# Patient Record
Sex: FEMALE | Race: WHITE | NOT HISPANIC OR LATINO
[De-identification: names, ages, dates, MRNs, and addresses within clinical notes are randomized per-mention and may not be internally consistent; named-entity substitution may affect disease eponyms.]

---

## 2019-05-09 ENCOUNTER — APPOINTMENT (OUTPATIENT)
Dept: FAMILY MEDICINE | Facility: CLINIC | Age: 54
End: 2019-05-09

## 2019-05-14 ENCOUNTER — APPOINTMENT (OUTPATIENT)
Dept: ENDOCRINOLOGY | Facility: CLINIC | Age: 54
End: 2019-05-14
Payer: COMMERCIAL

## 2019-05-14 VITALS
DIASTOLIC BLOOD PRESSURE: 60 MMHG | WEIGHT: 185 LBS | HEART RATE: 75 BPM | BODY MASS INDEX: 34.93 KG/M2 | RESPIRATION RATE: 16 BRPM | OXYGEN SATURATION: 96 % | HEIGHT: 61 IN | SYSTOLIC BLOOD PRESSURE: 124 MMHG

## 2019-05-14 LAB — GLUCOSE BLDC GLUCOMTR-MCNC: 118

## 2019-05-14 PROCEDURE — 99245 OFF/OP CONSLTJ NEW/EST HI 55: CPT | Mod: 25

## 2019-05-14 PROCEDURE — 82962 GLUCOSE BLOOD TEST: CPT

## 2019-05-14 PROCEDURE — 36415 COLL VENOUS BLD VENIPUNCTURE: CPT

## 2019-05-14 NOTE — CONSULT LETTER
[Dear  ___] : Dear  [unfilled], [Courtesy Letter:] : I had the pleasure of seeing your patient, [unfilled], in my office today. [Sincerely,] : Sincerely, [FreeTextEntry1] : Thank you for referring  Ms. BERNARDO BENSON to me for evaluation and treatment. Please, see attached consultation note. As always, if there are specific questions you would like to discuss, please feel free to contact me.\par Thank you for the courtesy of this evaluation.\par  [FreeTextEntry3] : Holli Ocasio MD, FACE, EC[facility]\par

## 2019-05-14 NOTE — ASSESSMENT
[Carbohydrate Consistent Diet] : carbohydrate consistent diet [Diabetes Foot Care] : diabetes foot care [Long Term Vascular Complications] : long term vascular complications of diabetes [Hypoglycemia Management] : hypoglycemia management [Self Monitoring of Blood Glucose] : self monitoring of blood glucose [Importance of Diet and Exercise] : importance of diet and exercise to improve glycemic control, achieve weight loss and improve cardiovascular health [FreeTextEntry1] : Current approaches to diabetes management are discussed with the patient. \par Target ranges for blood sugar, blood pressure and cholesterol reviewed, and risk reduction strategies verified. \par Hypoglycemia precautions reviewed with the patient. \par Suggested extensive diabetes education program, including nutritional and diabetes teaching and evaluation. \par Proper dietary restrictions and exercise routines discussed. \par Glucometer/SMBG and log book charting discussed.\par - check lipids, a1c, CMP, thyroid functions, antibodies status\par - reviewed with the patient options for switching to different classes of medications that are weight neutral and do not typically cause hypoglycemia. Will decide after the  blood work and possibly switch back to metformin\par - monitor urine microalbumin\par - in regards of her MNG, bilateral subcm nodules appear stable for the past 8+ years. We will be repeating another sonogram in 1-2 years\par She'll return in 2-3 days for results and dose adjustment.

## 2019-05-14 NOTE — HISTORY OF PRESENT ILLNESS
[FreeTextEntry1] : HISTORY OF PRESENT ILLNESS. \par \par Ms. BENSON was diagnosed with Diabetes Mellitus Type 2 in ?? \par Denies known complications of retinopathy, nephropathy, or neuropathy. \par Reports history obesity, HTN, dyslipidemia, MNG. Denies CAD. She underwent a lap-band in 2014; initially lost about 40 lbs, but regained 15 lbs since then\par Presently on glipizide XL 5mg only. She is also on losartan 50mg qd, crestor 5mg\par Blood sugars are checked 2 times a day. \par Did not bring log book, but reported are typically as following: FBS- 80's, PPG- 130's- 140's. \par Hypoglycemia frequency: reports one episode of  subjective hypoglycemia when going to the gym\par Fingerstick glucose in the office today is 118 mg/dL 5 hours after eating. \par Diet: tries to monitor CHO, increased fiber in the diet. \par Exercise: none\par \par Last dilated eye - Dr. Schaefer (03/18)\par Last podiatry visit  - several years ago\par Last cardiology evaluation - Dr. Mendenhall \par Last stress test - 2015\par Last 2-D Echo - 2015\par Last nephrology evaluation - none\par Last neurology evaluation- none\par \par Thyroid US (7/13/18)- enlarged.  multiple b/l subcm nodules, including RMP hypo 0.4x0.5x0.3 (stable); LMP 0.4x0.2x0.3 hypo (stable)\par Thyroid US (7/18/17)- enlarge. multiple b/l subcm nodules, including RMP hypo 0.5x0.3x0.4 (stable); LUP hypo 0.5x0.3x0.5 (stable)\par Thyroid US (3/21/16)- right superior 0.8 hypo nodule, LMP 0.4 hypo nodule- both are stable\par Thyroid US (4/15/15) -  right superior 0.5 hypo nodule, no left sided nodules\par Thyroid US (1/29/10)- right superior 0.4 hypo nodule, RMP 0.3 +0.4 hypo; LUP hypo 0.3, LUMP hypo 0.3- both described as stable c/w study from  2009\par \par Lab review: from 08/18- a1c- 6.5, LDL- 155\par \par

## 2019-05-15 LAB
ESTIMATED AVERAGE GLUCOSE: 143 MG/DL
FOLATE RBC-MCNC: 868 NG/ML
HBA1C MFR BLD HPLC: 6.6 %
HCT VFR BLD CALC: 40.3 %

## 2019-05-17 LAB
25(OH)D3 SERPL-MCNC: 21.5 NG/ML
ALBUMIN SERPL ELPH-MCNC: 4.5 G/DL
ALP BLD-CCNC: 94 U/L
ALT SERPL-CCNC: 13 U/L
ANION GAP SERPL CALC-SCNC: 13 MMOL/L
AST SERPL-CCNC: 12 U/L
BILIRUB SERPL-MCNC: 0.3 MG/DL
BUN SERPL-MCNC: 11 MG/DL
CALCIUM SERPL-MCNC: 9.1 MG/DL
CHLORIDE SERPL-SCNC: 106 MMOL/L
CHOLEST SERPL-MCNC: 233 MG/DL
CHOLEST/HDLC SERPL: 4.3 RATIO
CO2 SERPL-SCNC: 24 MMOL/L
CREAT SERPL-MCNC: 0.63 MG/DL
CREAT SPEC-SCNC: 116 MG/DL
FRUCTOSAMINE SERPL-MCNC: 231 UMOL/L
GLUCOSE SERPL-MCNC: 97 MG/DL
HDLC SERPL-MCNC: 54 MG/DL
LDLC SERPL CALC-MCNC: 148 MG/DL
MICROALBUMIN 24H UR DL<=1MG/L-MCNC: <1.2 MG/DL
MICROALBUMIN/CREAT 24H UR-RTO: NORMAL MG/G
POTASSIUM SERPL-SCNC: 4.1 MMOL/L
PROT SERPL-MCNC: 7.1 G/DL
SODIUM SERPL-SCNC: 143 MMOL/L
T4 FREE SERPL-MCNC: 1.2 NG/DL
TRIGL SERPL-MCNC: 154 MG/DL
TSH SERPL-ACNC: 1.18 UIU/ML
VIT B12 SERPL-MCNC: 331 PG/ML

## 2019-08-13 RX ORDER — SITAGLIPTIN AND METFORMIN HYDROCHLORIDE 50; 500 MG/1; MG/1
50-500 TABLET, FILM COATED ORAL TWICE DAILY
Qty: 60 | Refills: 6 | Status: DISCONTINUED | COMMUNITY
Start: 2019-05-17 | End: 2019-08-13

## 2020-02-04 ENCOUNTER — RX RENEWAL (OUTPATIENT)
Age: 55
End: 2020-02-04

## 2020-04-07 ENCOUNTER — APPOINTMENT (OUTPATIENT)
Dept: ENDOCRINOLOGY | Facility: CLINIC | Age: 55
End: 2020-04-07

## 2020-04-20 ENCOUNTER — RX RENEWAL (OUTPATIENT)
Age: 55
End: 2020-04-20

## 2020-06-06 ENCOUNTER — RX RENEWAL (OUTPATIENT)
Age: 55
End: 2020-06-06

## 2020-06-08 ENCOUNTER — RX RENEWAL (OUTPATIENT)
Age: 55
End: 2020-06-08

## 2020-06-25 ENCOUNTER — APPOINTMENT (OUTPATIENT)
Dept: ENDOCRINOLOGY | Facility: CLINIC | Age: 55
End: 2020-06-25

## 2020-07-08 ENCOUNTER — APPOINTMENT (OUTPATIENT)
Dept: ENDOCRINOLOGY | Facility: CLINIC | Age: 55
End: 2020-07-08
Payer: COMMERCIAL

## 2020-07-08 VITALS
HEART RATE: 77 BPM | OXYGEN SATURATION: 98 % | SYSTOLIC BLOOD PRESSURE: 142 MMHG | RESPIRATION RATE: 16 BRPM | WEIGHT: 182 LBS | DIASTOLIC BLOOD PRESSURE: 80 MMHG | BODY MASS INDEX: 34.36 KG/M2 | HEIGHT: 61 IN

## 2020-07-08 LAB — GLUCOSE BLDC GLUCOMTR-MCNC: 146

## 2020-07-08 PROCEDURE — 99214 OFFICE O/P EST MOD 30 MIN: CPT

## 2020-07-08 PROCEDURE — 82962 GLUCOSE BLOOD TEST: CPT

## 2020-07-08 NOTE — ASSESSMENT
[Carbohydrate Consistent Diet] : carbohydrate consistent diet [Diabetes Foot Care] : diabetes foot care [Hypoglycemia Management] : hypoglycemia management [Long Term Vascular Complications] : long term vascular complications of diabetes [Importance of Diet and Exercise] : importance of diet and exercise to improve glycemic control, achieve weight loss and improve cardiovascular health [Self Monitoring of Blood Glucose] : self monitoring of blood glucose [FreeTextEntry1] : - compliance is reiterated\par - continue Jentadueto 2.5/500 mg bid\par - increase crestor 5 mg, continue CoQ10 300 mg qd\par - vitamin D3 5000 IU/day\par - in regards of her MNG, bilateral subcm nodules appear stable for the past 8+ years. We will be repeating another sonogram next year (7/21)\par RTC 3 mos

## 2020-07-08 NOTE — HISTORY OF PRESENT ILLNESS
[FreeTextEntry1] : F/u for diabetes management and MNG.\par \par *** Jul 08, 2020 ***\par \par missed f/u appts\par feels well. denies any c/o\par on jentadueto 2.5/ 500 mg bid, crestor 5 mg qw at most, CoQ10 300 mg qd\par \par a1c- 6.4, LDL- 135, TC- 231\par \par HISTORY OF PRESENT ILLNESS. \par \par Ms. BENSON was diagnosed with Diabetes Mellitus Type 2 in ?? \par Denies known complications of retinopathy, nephropathy, or neuropathy. \par Reports history obesity, HTN, dyslipidemia, MNG. Denies CAD. She underwent a lap-band in 2014; initially lost about 40 lbs, but regained 15 lbs since then\par Presently on glipizide XL 5mg only. She is also on losartan 50mg qd, crestor 5mg\par Blood sugars are checked 2 times a day. \par Did not bring log book, but reported are typically as following: FBS- 80's, PPG- 130's- 140's. \par Hypoglycemia frequency: reports one episode of  subjective hypoglycemia when going to the gym\par Fingerstick glucose in the office today is 118 mg/dL 5 hours after eating. \par Diet: tries to monitor CHO, increased fiber in the diet. \par Exercise: none\par \par Thyroid US (7/13/18)- enlarged.  multiple b/l subcm nodules, including RMP hypo 0.4x0.5x0.3 (stable); LMP 0.4x0.2x0.3 hypo (stable)\par Thyroid US (7/18/17)- enlarge. multiple b/l subcm nodules, including RMP hypo 0.5x0.3x0.4 (stable); LUP hypo 0.5x0.3x0.5 (stable)\par Thyroid US (3/21/16)- right superior 0.8 hypo nodule, LMP 0.4 hypo nodule- both are stable\par Thyroid US (4/15/15) -  right superior 0.5 hypo nodule, no left sided nodules\par Thyroid US (1/29/10)- right superior 0.4 hypo nodule, RMP 0.3 +0.4 hypo; LUP hypo 0.3, LUMP hypo 0.3- both described as stable c/w study from  2009\par \par Lab review: from 08/18- a1c- 6.5, LDL- 155\par \par \par ****\par \par Last dilated eye - Dr. Schaefer (04/20)\par Last podiatry visit  - several years ago\par Last cardiology evaluation - Dr. Mendenhall \par Last stress test - 2015\par Last 2-D Echo - 2015\par Last nephrology evaluation - none\par Last neurology evaluation- none\par

## 2020-10-08 ENCOUNTER — APPOINTMENT (OUTPATIENT)
Dept: ENDOCRINOLOGY | Facility: CLINIC | Age: 55
End: 2020-10-08

## 2021-05-04 ENCOUNTER — APPOINTMENT (OUTPATIENT)
Dept: ENDOCRINOLOGY | Facility: CLINIC | Age: 56
End: 2021-05-04

## 2021-07-19 ENCOUNTER — RX RENEWAL (OUTPATIENT)
Age: 56
End: 2021-07-19

## 2021-09-01 ENCOUNTER — RX RENEWAL (OUTPATIENT)
Age: 56
End: 2021-09-01

## 2021-09-05 ENCOUNTER — RX RENEWAL (OUTPATIENT)
Age: 56
End: 2021-09-05

## 2021-11-08 ENCOUNTER — APPOINTMENT (OUTPATIENT)
Dept: ENDOCRINOLOGY | Facility: CLINIC | Age: 56
End: 2021-11-08
Payer: COMMERCIAL

## 2021-11-08 VITALS
OXYGEN SATURATION: 98 % | HEART RATE: 87 BPM | HEIGHT: 61 IN | TEMPERATURE: 98 F | BODY MASS INDEX: 35.68 KG/M2 | WEIGHT: 189 LBS | SYSTOLIC BLOOD PRESSURE: 120 MMHG | DIASTOLIC BLOOD PRESSURE: 60 MMHG | RESPIRATION RATE: 17 BRPM

## 2021-11-08 LAB — GLUCOSE BLDC GLUCOMTR-MCNC: 126

## 2021-11-08 PROCEDURE — 95250 CONT GLUC MNTR PHYS/QHP EQP: CPT

## 2021-11-08 PROCEDURE — 99214 OFFICE O/P EST MOD 30 MIN: CPT | Mod: 25

## 2021-11-08 PROCEDURE — 95251 CONT GLUC MNTR ANALYSIS I&R: CPT

## 2021-11-08 PROCEDURE — 36415 COLL VENOUS BLD VENIPUNCTURE: CPT

## 2021-11-08 RX ORDER — LINAGLIPTIN AND METFORMIN HYDROCHLORIDE 2.5; 5 MG/1; MG/1
2.5-5 TABLET, FILM COATED ORAL
Qty: 60 | Refills: 0 | Status: DISCONTINUED | COMMUNITY
Start: 2019-08-13 | End: 2021-11-08

## 2021-11-08 NOTE — ASSESSMENT
[Carbohydrate Consistent Diet] : carbohydrate consistent diet [Hypoglycemia Management] : hypoglycemia management [Diabetes Foot Care] : diabetes foot care [Long Term Vascular Complications] : long term vascular complications of diabetes [Importance of Diet and Exercise] : importance of diet and exercise to improve glycemic control, achieve weight loss and improve cardiovascular health [Self Monitoring of Blood Glucose] : self monitoring of blood glucose [FreeTextEntry1] : 1. T2DM, uncontrolled\par - compliance is reiterated\par - Gloria PRO\par - d/c Jentadueto \par - Metformin 1000 mg bid\par - Ozempic 0.25 mg qw and uptitrate as directed\par - Jardiance 10 mg qd\par - crestor 5 mg, continue CoQ10 300 mg qd\par - vitamin D3 5000 IU/day\par \par 2. MNG\par -bilateral subcm nodules appear stable for the past 8+ years. \par  repeat another sonogram this year \par RTC 2- 3 weeks for sensor download and CDE evaluation\par

## 2021-11-08 NOTE — HISTORY OF PRESENT ILLNESS
[FreeTextEntry1] : F/u for diabetes management and MNG.\par \par *** Nov 08, 2021 ***\par \par again, missed f/u visits\par on jentadueto 2.5/ 500 mg bid, crestor 5 mg qw at most, CoQ10 300 mg qd\par labs from 10/31/21-  a1c- 7.7\par reports fbs- 160's, ppg- not checking\par \par *** Jul 08, 2020 ***\par \par missed f/u appts\par feels well. denies any c/o\par on jentadueto 2.5/ 500 mg bid, crestor 5 mg qw at most, CoQ10 300 mg qd\par \par a1c- 6.4, LDL- 135, TC- 231\par \par HISTORY OF PRESENT ILLNESS. \par \par Ms. BENSON was diagnosed with Diabetes Mellitus Type 2 in 2003\par Denies known complications of retinopathy, nephropathy, or neuropathy. \par Reports history obesity, HTN, dyslipidemia, MNG. Denies CAD. She underwent a lap-band in 2014; initially lost about 40 lbs, but regained 15 lbs since then\par Presently on glipizide XL 5mg only. She is also on losartan 50mg qd, crestor 5mg\par Blood sugars are checked 2 times a day. \par Did not bring log book, but reported are typically as following: FBS- 80's, PPG- 130's- 140's. \par Hypoglycemia frequency: reports one episode of  subjective hypoglycemia when going to the gym\par Fingerstick glucose in the office today is 118 mg/dL 5 hours after eating. \par Diet: tries to monitor CHO, increased fiber in the diet. \par Exercise: none\par \par Thyroid US (7/13/18)- enlarged.  multiple b/l subcm nodules, including RMP hypo 0.4x0.5x0.3 (stable); LMP 0.4x0.2x0.3 hypo (stable)\par Thyroid US (7/18/17)- enlarge. multiple b/l subcm nodules, including RMP hypo 0.5x0.3x0.4 (stable); LUP hypo 0.5x0.3x0.5 (stable)\par Thyroid US (3/21/16)- right superior 0.8 hypo nodule, LMP 0.4 hypo nodule- both are stable\par Thyroid US (4/15/15) -  right superior 0.5 hypo nodule, no left sided nodules\par Thyroid US (1/29/10)- right superior 0.4 hypo nodule, RMP 0.3 +0.4 hypo; LUP hypo 0.3, LUMP hypo 0.3- both described as stable c/w study from  2009\par \par Lab review: from 08/18- a1c- 6.5, LDL- 155\par \par \par ****\par \par Last dilated eye -   (04/21)\par Last podiatry visit  - 5/21 ([facility])\par Last cardiology evaluation - Dr. Mendenhall \par Last stress test - 2015\par Last 2-D Echo - 2015\par Last nephrology evaluation - none\par Last neurology evaluation- none\par

## 2021-11-09 LAB
CREAT SPEC-SCNC: 162 MG/DL
MICROALBUMIN 24H UR DL<=1MG/L-MCNC: <1.2 MG/DL
MICROALBUMIN/CREAT 24H UR-RTO: NORMAL MG/G

## 2021-11-30 ENCOUNTER — APPOINTMENT (OUTPATIENT)
Dept: ENDOCRINOLOGY | Facility: CLINIC | Age: 56
End: 2021-11-30
Payer: COMMERCIAL

## 2021-11-30 VITALS — WEIGHT: 184 LBS | HEIGHT: 61 IN | BODY MASS INDEX: 34.74 KG/M2

## 2021-11-30 PROCEDURE — G0108 DIAB MANAGE TRN  PER INDIV: CPT

## 2022-04-14 ENCOUNTER — APPOINTMENT (OUTPATIENT)
Dept: ENDOCRINOLOGY | Facility: CLINIC | Age: 57
End: 2022-04-14
Payer: COMMERCIAL

## 2022-04-14 VITALS
BODY MASS INDEX: 32.47 KG/M2 | SYSTOLIC BLOOD PRESSURE: 129 MMHG | TEMPERATURE: 98 F | OXYGEN SATURATION: 97 % | HEIGHT: 61 IN | DIASTOLIC BLOOD PRESSURE: 50 MMHG | RESPIRATION RATE: 17 BRPM | WEIGHT: 172 LBS | HEART RATE: 88 BPM

## 2022-04-14 LAB — GLUCOSE BLDC GLUCOMTR-MCNC: 151

## 2022-04-14 PROCEDURE — 82962 GLUCOSE BLOOD TEST: CPT

## 2022-04-14 PROCEDURE — 99214 OFFICE O/P EST MOD 30 MIN: CPT | Mod: 25

## 2022-04-14 NOTE — ASSESSMENT
[Carbohydrate Consistent Diet] : carbohydrate consistent diet [Hypoglycemia Management] : hypoglycemia management [Diabetes Foot Care] : diabetes foot care [Long Term Vascular Complications] : long term vascular complications of diabetes [Importance of Diet and Exercise] : importance of diet and exercise to improve glycemic control, achieve weight loss and improve cardiovascular health [Self Monitoring of Blood Glucose] : self monitoring of blood glucose [FreeTextEntry1] : 1. T2DM, well controlled\par - compliance is reiterated\par - Metformin 1000 mg bid\par - Ozempic 0.5 mg qw. Patient does not want to increase  the dose at present\par - Jardiance 10 mg qd\par - crestor 5 mg, continue CoQ10 300 mg qd\par - vitamin D3 5000 IU/day\par \par 2. MNG\par -bilateral subcm nodules appear stable for the past 8+ years. \par  repeat another sonogram  in 3 years \par RTC 4-6 mos\par

## 2022-04-14 NOTE — HISTORY OF PRESENT ILLNESS
[FreeTextEntry1] : F/u for diabetes management and MNG.\par \par *** Apr 14, 2022 ***\par \par feels great, lost 17 lbs on ozempic\par Metformin 1000 mg bid,  Ozempic 0.5 mg qw,  Jardiance 10 mg qd\par not checking her FS consistently\par labs from 3/26/22- a1c- 6.0, LDL- 68\par \par Thyroid ultrasound (11/27/2021)–bilateral subcentimeter thyroid nodules, including right midpole solid 0.5 x 0.3 x 0.4 cm nodule, left upper pole 0.4 x 0.3 x 0.5 solid nodule, and left midpole 0.4 x 0.3 x 0.3 solid nodule.\par \par *** Nov 08, 2021 ***\par \par again, missed f/u visits\par on jentadueto 2.5/ 500 mg bid, crestor 5 mg qw at most, CoQ10 300 mg qd\par labs from 10/31/21-  a1c- 7.7\par reports fbs- 160's, ppg- not checking\par \par *** Jul 08, 2020 ***\par \par missed f/u appts\par feels well. denies any c/o\par on jentadueto 2.5/ 500 mg bid, crestor 5 mg qw at most, CoQ10 300 mg qd\par \par a1c- 6.4, LDL- 135, TC- 231\par \par HISTORY OF PRESENT ILLNESS. \par \par Ms. BENSON was diagnosed with Diabetes Mellitus Type 2 in 2003\par Denies known complications of retinopathy, nephropathy, or neuropathy. \par Reports history obesity, HTN, dyslipidemia, MNG. Denies CAD. She underwent a lap-band in 2014; initially lost about 40 lbs, but regained 15 lbs since then\par Presently on glipizide XL 5mg only. She is also on losartan 50mg qd, crestor 5mg\par Blood sugars are checked 2 times a day. \par Did not bring log book, but reported are typically as following: FBS- 80's, PPG- 130's- 140's. \par Hypoglycemia frequency: reports one episode of  subjective hypoglycemia when going to the gym\par Fingerstick glucose in the office today is 118 mg/dL 5 hours after eating. \par Diet: tries to monitor CHO, increased fiber in the diet. \par Exercise: none\par \par Thyroid US (7/13/18)- enlarged.  multiple b/l subcm nodules, including RMP hypo 0.4x0.5x0.3 (stable); LMP 0.4x0.2x0.3 hypo (stable)\par Thyroid US (7/18/17)- enlarge. multiple b/l subcm nodules, including RMP hypo 0.5x0.3x0.4 (stable); LUP hypo 0.5x0.3x0.5 (stable)\par Thyroid US (3/21/16)- right superior 0.8 hypo nodule, LMP 0.4 hypo nodule- both are stable\par Thyroid US (4/15/15) -  right superior 0.5 hypo nodule, no left sided nodules\par Thyroid US (1/29/10)- right superior 0.4 hypo nodule, RMP 0.3 +0.4 hypo; LUP hypo 0.3, LUMP hypo 0.3- both described as stable c/w study from  2009\par \par Lab review: from 08/18- a1c- 6.5, LDL- 155\par \par \par ****\par \par Last dilated eye -   (04/21)\par Last podiatry visit  - 5/21 ([facility])\par Last cardiology evaluation - Dr. Mendenhall \par Last stress test - 2015\par Last 2-D Echo - 2015\par Last nephrology evaluation - none\par Last neurology evaluation- none\par

## 2022-05-09 ENCOUNTER — OUTPATIENT (OUTPATIENT)
Dept: OUTPATIENT SERVICES | Facility: HOSPITAL | Age: 57
LOS: 1 days | End: 2022-05-09
Payer: COMMERCIAL

## 2022-05-09 ENCOUNTER — APPOINTMENT (OUTPATIENT)
Dept: MAMMOGRAPHY | Facility: CLINIC | Age: 57
End: 2022-05-09
Payer: COMMERCIAL

## 2022-05-09 ENCOUNTER — APPOINTMENT (OUTPATIENT)
Dept: ULTRASOUND IMAGING | Facility: CLINIC | Age: 57
End: 2022-05-09
Payer: COMMERCIAL

## 2022-05-09 DIAGNOSIS — Z12.31 ENCOUNTER FOR SCREENING MAMMOGRAM FOR MALIGNANT NEOPLASM OF BREAST: ICD-10-CM

## 2022-05-09 PROCEDURE — 77067 SCR MAMMO BI INCL CAD: CPT | Mod: 26

## 2022-05-09 PROCEDURE — 76641 ULTRASOUND BREAST COMPLETE: CPT | Mod: 26,50

## 2022-05-09 PROCEDURE — 76641 ULTRASOUND BREAST COMPLETE: CPT

## 2022-05-09 PROCEDURE — 77063 BREAST TOMOSYNTHESIS BI: CPT

## 2022-05-09 PROCEDURE — 77067 SCR MAMMO BI INCL CAD: CPT

## 2022-05-09 PROCEDURE — 77063 BREAST TOMOSYNTHESIS BI: CPT | Mod: 26

## 2022-06-01 ENCOUNTER — RX RENEWAL (OUTPATIENT)
Age: 57
End: 2022-06-01

## 2022-08-02 ENCOUNTER — APPOINTMENT (OUTPATIENT)
Dept: FAMILY MEDICINE | Facility: CLINIC | Age: 57
End: 2022-08-02

## 2022-08-02 ENCOUNTER — NON-APPOINTMENT (OUTPATIENT)
Age: 57
End: 2022-08-02

## 2022-08-02 VITALS
TEMPERATURE: 97.3 F | SYSTOLIC BLOOD PRESSURE: 136 MMHG | OXYGEN SATURATION: 97 % | HEIGHT: 61 IN | HEART RATE: 80 BPM | BODY MASS INDEX: 32.28 KG/M2 | WEIGHT: 171 LBS | DIASTOLIC BLOOD PRESSURE: 84 MMHG

## 2022-08-02 DIAGNOSIS — Z80.0 FAMILY HISTORY OF MALIGNANT NEOPLASM OF DIGESTIVE ORGANS: ICD-10-CM

## 2022-08-02 DIAGNOSIS — Z82.49 FAMILY HISTORY OF ISCHEMIC HEART DISEASE AND OTHER DISEASES OF THE CIRCULATORY SYSTEM: ICD-10-CM

## 2022-08-02 DIAGNOSIS — Z00.00 ENCOUNTER FOR GENERAL ADULT MEDICAL EXAMINATION W/OUT ABNORMAL FINDINGS: ICD-10-CM

## 2022-08-02 DIAGNOSIS — Z80.3 FAMILY HISTORY OF MALIGNANT NEOPLASM OF BREAST: ICD-10-CM

## 2022-08-02 PROCEDURE — 99386 PREV VISIT NEW AGE 40-64: CPT | Mod: 25

## 2022-08-02 PROCEDURE — 93000 ELECTROCARDIOGRAM COMPLETE: CPT

## 2022-08-02 PROCEDURE — 36415 COLL VENOUS BLD VENIPUNCTURE: CPT

## 2022-08-02 NOTE — HEALTH RISK ASSESSMENT
[Former] : Former [Yes] : Yes [2 - 4 times a month (2 pts)] : 2-4 times a month (2 points) [With Significant Other] : lives with significant other [# of Members in Household ___] :  household currently consist of [unfilled] member(s) [Employed] : employed [] :  [# Of Children ___] : has [unfilled] children [de-identified] : 30 years-pack a day smoker  [YearQuit] : 1999 [de-identified] : 3-4x a week [de-identified] : Varied diet [FreeTextEntry2] :  in Middle School  [FreeTextEntry3] : 2 sons

## 2022-08-02 NOTE — PLAN
[FreeTextEntry1] : Discussed bariatric re-eval-if problems with lap band.  \par \par Labs from endocrine reviewed. \par Patient returning for Pre-Op evaluation.\par Asked to have records from Cardiology sent to office, prior EKG. \par EKG today-sinus rhythm-negative precordial t-waves.\par \par HTN-meds refilled; has been out for several weeks. \par \par HM-up to date Gyn, will be rescheduling colonoscopy. \par \par \par

## 2022-08-02 NOTE — PHYSICAL EXAM
[Normal Sclera/Conjunctiva] : normal sclera/conjunctiva [Normal Oropharynx] : the oropharynx was normal [Normal TMs] : both tympanic membranes were normal [No Lymphadenopathy] : no lymphadenopathy [No Edema] : there was no peripheral edema [No Extremity Clubbing/Cyanosis] : no extremity clubbing/cyanosis [Normal] : affect was normal and insight and judgment were intact [de-identified] : lap band

## 2022-08-02 NOTE — REVIEW OF SYSTEMS
[Negative] : Genitourinary [Headache] : no headache [Dizziness] : no dizziness [de-identified] : 6-7 hours of sleep per night

## 2022-08-02 NOTE — HISTORY OF PRESENT ILLNESS
[FreeTextEntry1] : Here to establish care/annual physical.  [de-identified] : Here to establish care/annual physical. \par Pre-Surgical Testing . \par Genetic predeposition to cancer-planning to take out ovaries prophylactically. \par \par Mammogram-May 6th\par Pap Smear-May 2022-normal \par Colonoscopy-2017-due for follow-up.  \par \par Cardiologist-Dr. Cardona-saw within the past year. \par \par Sx: , lap band, pilonidal cyst\par \par Family Hx: Colon cancer, pancreatic cancer, bile duct cancer, breast cancer in cousin, brother lymphoma \par \par -Lap band-8 years ago.    Had adjusted after surgery. \par

## 2022-08-17 ENCOUNTER — APPOINTMENT (OUTPATIENT)
Dept: FAMILY MEDICINE | Facility: CLINIC | Age: 57
End: 2022-08-17

## 2022-08-17 VITALS
WEIGHT: 176 LBS | TEMPERATURE: 97.6 F | HEIGHT: 61 IN | HEART RATE: 77 BPM | SYSTOLIC BLOOD PRESSURE: 128 MMHG | BODY MASS INDEX: 33.23 KG/M2 | OXYGEN SATURATION: 97 % | DIASTOLIC BLOOD PRESSURE: 70 MMHG

## 2022-08-17 DIAGNOSIS — Z01.818 ENCOUNTER FOR OTHER PREPROCEDURAL EXAMINATION: ICD-10-CM

## 2022-08-17 PROCEDURE — 99214 OFFICE O/P EST MOD 30 MIN: CPT

## 2022-08-17 NOTE — PLAN
[FreeTextEntry1] : Patient to hold Jardiance 4 days before surgery. \par Holding metformin morning of procedure. \par Holding Ozempic this week.\par Patient was counseled to stop any Advil/Aleve/aspirin and any blood-thinning medications/supplements 7 days prior to surgery and was advised to have nothing by mouth from 11 pm the night prior to surgery.\par \par Patient expressed understanding of plan.

## 2022-08-17 NOTE — RESULTS/DATA
[] : results reviewed [de-identified] : sinus bradycardia at 57 bpm [de-identified] : Type and Screen Preliminary

## 2022-08-17 NOTE — ASSESSMENT
[High Risk Surgery - Intraperitoneal, Intrathoracic or Supringuinal Vascular Procedures] : High Risk Surgery - Intraperitoneal, Intrathoracic or Supringuinal Vascular Procedures - No (0) [Ischemic Heart Disease] : Ischemic Heart Disease - No (0) [Congestive Heart Failure] : Congestive Heart Failure - No (0) [Prior Cerebrovascular Accident or TIA] : Prior Cerebrovascular Accident or TIA - No (0) [Creatinine >= 2mg/dL (1 Point)] : Creatinine >= 2mg/dL - No (0) [Insulin-dependent Diabetic (1 Point)] : Insulin-dependent Diabetic - No (0) [0] : 0 , RCRI Class: I, Risk of Post-Op Cardiac Complications: 3.9%, 95% CI for Risk Estimate: 2.8% - 5.4% [Patient Optimized for Surgery] : Patient optimized for surgery [As per surgery] : as per surgery [FreeTextEntry4] : Patient to hold Jardiance 4 days before surgery. \par Holding metformin morning of procedure. \par Holding Ozempic this week.\par Patient was counseled to stop any Advil/Aleve/aspirin and any blood-thinning medications/supplements 7 days prior to surgery and was advised to have nothing by mouth from 11 pm the night prior to surgery.\par

## 2022-08-17 NOTE — PHYSICAL EXAM
[Normal Oropharynx] : the oropharynx was normal [No Lymphadenopathy] : no lymphadenopathy [No Edema] : there was no peripheral edema [No Extremity Clubbing/Cyanosis] : no extremity clubbing/cyanosis [Normal] : affect was normal and insight and judgment were intact [Soft] : abdomen soft [Non Tender] : non-tender [Normal Bowel Sounds] : normal bowel sounds

## 2022-08-17 NOTE — HISTORY OF PRESENT ILLNESS
[No Pertinent Cardiac History] : no history of aortic stenosis, atrial fibrillation, coronary artery disease, recent myocardial infarction, or implantable device/pacemaker [No Adverse Anesthesia Reaction] : no adverse anesthesia reaction in self or family member [Diabetes] : diabetes [(Patient denies any chest pain, claudication, dyspnea on exertion, orthopnea, palpitations or syncope)] : Patient denies any chest pain, claudication, dyspnea on exertion, orthopnea, palpitations or syncope [Aortic Stenosis] : no aortic stenosis [Atrial Fibrillation] : no atrial fibrillation [Coronary Artery Disease] : no coronary artery disease [Recent Myocardial Infarction] : no recent myocardial infarction [Implantable Device/Pacemaker] : no implantable device/pacemaker [Asthma] : no asthma [COPD] : no COPD [Sleep Apnea] : no sleep apnea [Smoker] : not a smoker [Chronic Anticoagulation] : no chronic anticoagulation [Chronic Kidney Disease] : no chronic kidney disease [FreeTextEntry1] : Robotic BSO [FreeTextEntry2] : 08/22/2022 [FreeTextEntry3] : Dr. Schneider [FreeTextEntry4] : Here for pre-operative evaluation for Robotic BSO; planning for ambulatory procedure. \par Patient has to do home COVID test the night before procedure.  \par \par Received her initial COVID series, has not gotten booster yet. \par \par Feeling well today. \par Medications and allergies reviewed.\par  [FreeTextEntry7] : January Echo 2021

## 2022-08-25 ENCOUNTER — APPOINTMENT (OUTPATIENT)
Dept: ENDOCRINOLOGY | Facility: CLINIC | Age: 57
End: 2022-08-25

## 2022-10-11 ENCOUNTER — APPOINTMENT (OUTPATIENT)
Dept: FAMILY MEDICINE | Facility: CLINIC | Age: 57
End: 2022-10-11

## 2022-10-11 ENCOUNTER — LABORATORY RESULT (OUTPATIENT)
Age: 57
End: 2022-10-11

## 2022-10-11 VITALS
OXYGEN SATURATION: 97 % | SYSTOLIC BLOOD PRESSURE: 118 MMHG | BODY MASS INDEX: 32.66 KG/M2 | HEART RATE: 78 BPM | TEMPERATURE: 97.4 F | HEIGHT: 61 IN | DIASTOLIC BLOOD PRESSURE: 70 MMHG | WEIGHT: 173 LBS

## 2022-10-11 DIAGNOSIS — Z23 ENCOUNTER FOR IMMUNIZATION: ICD-10-CM

## 2022-10-11 PROCEDURE — 90686 IIV4 VACC NO PRSV 0.5 ML IM: CPT

## 2022-10-11 PROCEDURE — 99214 OFFICE O/P EST MOD 30 MIN: CPT | Mod: 25

## 2022-10-11 PROCEDURE — 36415 COLL VENOUS BLD VENIPUNCTURE: CPT

## 2022-10-11 PROCEDURE — G0008: CPT

## 2022-10-11 NOTE — PLAN
[FreeTextEntry1] : Will follow up labwork drawn in office today.\par \par DM-check labs-seeing endocrine early next year. \par \par Received flu shot.\par Advised Ms. BERNARDO BENSON they may experience some soreness, tenderness at the injection site.  Advised to call office if  not improving.  Ms. BENSON expressed understanding.\par \par Will adjust meds based on labs. \par Patient expressed understanding of plan.\par

## 2022-10-11 NOTE — HISTORY OF PRESENT ILLNESS
[FreeTextEntry1] : Here for follow-up; needs TB screening for work  [de-identified] : Here for follow-up; needs TB screening for work \par Feeling well today. \par \par No history of positive tuberculosis test. \par

## 2022-10-13 LAB
ALBUMIN SERPL ELPH-MCNC: 4.4 G/DL
ALP BLD-CCNC: 104 U/L
ALT SERPL-CCNC: 19 U/L
ANION GAP SERPL CALC-SCNC: 11 MMOL/L
APPEARANCE: CLEAR
AST SERPL-CCNC: 12 U/L
BASOPHILS # BLD AUTO: 0.04 K/UL
BASOPHILS NFR BLD AUTO: 0.5 %
BILIRUB SERPL-MCNC: 0.5 MG/DL
BILIRUBIN URINE: NEGATIVE
BLOOD URINE: NEGATIVE
BUN SERPL-MCNC: 10 MG/DL
CALCIUM SERPL-MCNC: 9.2 MG/DL
CHLORIDE SERPL-SCNC: 105 MMOL/L
CHOLEST SERPL-MCNC: 250 MG/DL
CO2 SERPL-SCNC: 27 MMOL/L
COLOR: NORMAL
CREAT SERPL-MCNC: 0.61 MG/DL
EGFR: 104 ML/MIN/1.73M2
EOSINOPHIL # BLD AUTO: 0.09 K/UL
EOSINOPHIL NFR BLD AUTO: 1.2 %
ESTIMATED AVERAGE GLUCOSE: 137 MG/DL
GLUCOSE QUALITATIVE U: ABNORMAL
GLUCOSE SERPL-MCNC: 81 MG/DL
HBA1C MFR BLD HPLC: 6.4 %
HCT VFR BLD CALC: 40.1 %
HDLC SERPL-MCNC: 53 MG/DL
HGB BLD-MCNC: 13 G/DL
IMM GRANULOCYTES NFR BLD AUTO: 0.3 %
KETONES URINE: NEGATIVE
LDLC SERPL CALC-MCNC: 156 MG/DL
LEUKOCYTE ESTERASE URINE: NEGATIVE
LYMPHOCYTES # BLD AUTO: 3 K/UL
LYMPHOCYTES NFR BLD AUTO: 38.6 %
MAN DIFF?: NORMAL
MCHC RBC-ENTMCNC: 29 PG
MCHC RBC-ENTMCNC: 32.4 GM/DL
MCV RBC AUTO: 89.5 FL
MONOCYTES # BLD AUTO: 0.48 K/UL
MONOCYTES NFR BLD AUTO: 6.2 %
NEUTROPHILS # BLD AUTO: 4.15 K/UL
NEUTROPHILS NFR BLD AUTO: 53.2 %
NITRITE URINE: NEGATIVE
NONHDLC SERPL-MCNC: 197 MG/DL
PH URINE: 6
PLATELET # BLD AUTO: 386 K/UL
POTASSIUM SERPL-SCNC: 4.6 MMOL/L
PROT SERPL-MCNC: 7 G/DL
PROTEIN URINE: NEGATIVE
RBC # BLD: 4.48 M/UL
RBC # FLD: 13.6 %
SODIUM SERPL-SCNC: 142 MMOL/L
SPECIFIC GRAVITY URINE: 1.01
TRIGL SERPL-MCNC: 204 MG/DL
TSH SERPL-ACNC: 0.77 UIU/ML
UROBILINOGEN URINE: NORMAL
WBC # FLD AUTO: 7.78 K/UL

## 2022-10-14 LAB
M TB IFN-G BLD-IMP: NEGATIVE
QUANTIFERON TB PLUS MITOGEN MINUS NIL: >10 IU/ML
QUANTIFERON TB PLUS NIL: 0.02 IU/ML
QUANTIFERON TB PLUS TB1 MINUS NIL: 0 IU/ML
QUANTIFERON TB PLUS TB2 MINUS NIL: 0 IU/ML

## 2022-11-20 ENCOUNTER — RX RENEWAL (OUTPATIENT)
Age: 57
End: 2022-11-20

## 2022-12-20 ENCOUNTER — RX RENEWAL (OUTPATIENT)
Age: 57
End: 2022-12-20

## 2023-01-03 ENCOUNTER — RX RENEWAL (OUTPATIENT)
Age: 58
End: 2023-01-03

## 2023-02-15 ENCOUNTER — RX RENEWAL (OUTPATIENT)
Age: 58
End: 2023-02-15

## 2023-02-16 ENCOUNTER — RX RENEWAL (OUTPATIENT)
Age: 58
End: 2023-02-16

## 2023-02-20 ENCOUNTER — NON-APPOINTMENT (OUTPATIENT)
Age: 58
End: 2023-02-20

## 2023-02-22 LAB
ALBUMIN SERPL ELPH-MCNC: 4.4 G/DL
ALP BLD-CCNC: 81 U/L
ALT SERPL-CCNC: 18 U/L
ANION GAP SERPL CALC-SCNC: 13 MMOL/L
AST SERPL-CCNC: 14 U/L
BILIRUB SERPL-MCNC: 0.9 MG/DL
BUN SERPL-MCNC: 8 MG/DL
CALCIUM SERPL-MCNC: 9.7 MG/DL
CHLORIDE SERPL-SCNC: 105 MMOL/L
CHOLEST SERPL-MCNC: 207 MG/DL
CO2 SERPL-SCNC: 25 MMOL/L
CREAT SERPL-MCNC: 0.68 MG/DL
CREAT SPEC-SCNC: 193 MG/DL
EGFR: 102 ML/MIN/1.73M2
ESTIMATED AVERAGE GLUCOSE: 140 MG/DL
FOLATE SERPL-MCNC: >20 NG/ML
FRUCTOSAMINE SERPL-MCNC: 226 UMOL/L
GLUCOSE SERPL-MCNC: 100 MG/DL
HBA1C MFR BLD HPLC: 6.5 %
HDLC SERPL-MCNC: 53 MG/DL
LDLC SERPL CALC-MCNC: 123 MG/DL
MICROALBUMIN 24H UR DL<=1MG/L-MCNC: <1.2 MG/DL
MICROALBUMIN/CREAT 24H UR-RTO: NORMAL MG/G
NONHDLC SERPL-MCNC: 154 MG/DL
POTASSIUM SERPL-SCNC: 4.6 MMOL/L
PROT SERPL-MCNC: 6.6 G/DL
SODIUM SERPL-SCNC: 143 MMOL/L
T4 FREE SERPL-MCNC: 1.4 NG/DL
TRIGL SERPL-MCNC: 153 MG/DL
TSH SERPL-ACNC: 0.8 UIU/ML
VIT B12 SERPL-MCNC: 246 PG/ML

## 2023-02-27 ENCOUNTER — TRANSCRIPTION ENCOUNTER (OUTPATIENT)
Age: 58
End: 2023-02-27

## 2023-02-27 ENCOUNTER — APPOINTMENT (OUTPATIENT)
Dept: ENDOCRINOLOGY | Facility: CLINIC | Age: 58
End: 2023-02-27
Payer: COMMERCIAL

## 2023-02-27 VITALS
BODY MASS INDEX: 32.66 KG/M2 | TEMPERATURE: 98 F | WEIGHT: 173 LBS | HEART RATE: 70 BPM | DIASTOLIC BLOOD PRESSURE: 70 MMHG | SYSTOLIC BLOOD PRESSURE: 120 MMHG | RESPIRATION RATE: 16 BRPM | OXYGEN SATURATION: 97 % | HEIGHT: 61 IN

## 2023-02-27 DIAGNOSIS — E04.2 NONTOXIC MULTINODULAR GOITER: ICD-10-CM

## 2023-02-27 LAB — GLUCOSE BLDC GLUCOMTR-MCNC: 79

## 2023-02-27 PROCEDURE — 82962 GLUCOSE BLOOD TEST: CPT

## 2023-02-27 PROCEDURE — 99214 OFFICE O/P EST MOD 30 MIN: CPT | Mod: 25

## 2023-02-27 RX ORDER — ROSUVASTATIN CALCIUM 5 MG/1
5 TABLET, FILM COATED ORAL DAILY
Qty: 30 | Refills: 3 | Status: DISCONTINUED | COMMUNITY
Start: 2021-09-01 | End: 2023-02-27

## 2023-02-27 NOTE — ASSESSMENT
[Diabetes Foot Care] : diabetes foot care [Long Term Vascular Complications] : long term vascular complications of diabetes [Carbohydrate Consistent Diet] : carbohydrate consistent diet [Importance of Diet and Exercise] : importance of diet and exercise to improve glycemic control, achieve weight loss and improve cardiovascular health [Exercise/Effect on Glucose] : exercise/effect on glucose [Hypoglycemia Management] : hypoglycemia management [Glucagon Use] : glucagon use [Self Monitoring of Blood Glucose] : self monitoring of blood glucose [Injection Technique, Storage, Sharps Disposal] : injection technique, storage, and sharps disposal [Retinopathy Screening] : Patient was referred to ophthalmology for retinopathy screening [Diabetic Medications] : Risks and benefits of diabetic medications were discussed [FreeTextEntry1] : 1. T2DM, well controlled\par - compliance is reiterated\par - Metformin 1000 mg bid\par - increase Ozempic 1 mg qw. Patient does not want to increase  the dose at present\par - Jardiance 10 mg qd\par - d/c crestor. trial of Pravastatin 10 mg HS, continue CoQ10 300 mg qd\par - vitamin D3 5000 IU/day\par \par 2. MNG\par -bilateral subcm nodules appear stable for the past 8+ years. \par  repeat another sonogram  in 3 years \par RTC 4-6 mos\par

## 2023-02-27 NOTE — HISTORY OF PRESENT ILLNESS
[FreeTextEntry1] : F/u for diabetes management and MNG.\par \par *** Feb 27, 2023 ***\par \par feels well. weight is stable\par Metformin 1000 mg bid,  Ozempic 0.5 mg qw,  Jardiance 10 mg qd, Crestor 5 mg tiw only b/o myalgia\par reports fbs- 90- 120\par a1c- 6.5, LDL- 126\par \par *** Apr 14, 2022 ***\par \par feels great, lost 17 lbs on ozempic\par Metformin 1000 mg bid,  Ozempic 0.5 mg qw,  Jardiance 10 mg qd\par not checking her FS consistently\par labs from 3/26/22- a1c- 6.0, LDL- 68\par \par Thyroid ultrasound (11/27/2021)–bilateral subcentimeter thyroid nodules, including right midpole solid 0.5 x 0.3 x 0.4 cm nodule, left upper pole 0.4 x 0.3 x 0.5 solid nodule, and left midpole 0.4 x 0.3 x 0.3 solid nodule.\par \par *** Nov 08, 2021 ***\par \par again, missed f/u visits\par on jentadueto 2.5/ 500 mg bid, crestor 5 mg qw at most, CoQ10 300 mg qd\par labs from 10/31/21-  a1c- 7.7\par reports fbs- 160's, ppg- not checking\par \par *** Jul 08, 2020 ***\par \par missed f/u appts\par feels well. denies any c/o\par on jentadueto 2.5/ 500 mg bid, crestor 5 mg qw at most, CoQ10 300 mg qd\par \par a1c- 6.4, LDL- 135, TC- 231\par \par HISTORY OF PRESENT ILLNESS. \par \par Ms. BENSON was diagnosed with Diabetes Mellitus Type 2 in 2003\par Denies known complications of retinopathy, nephropathy, or neuropathy. \par Reports history obesity, HTN, dyslipidemia, MNG. Denies CAD. She underwent a lap-band in 2014; initially lost about 40 lbs, but regained 15 lbs since then\par Presently on glipizide XL 5mg only. She is also on losartan 50mg qd, crestor 5mg\par Blood sugars are checked 2 times a day. \par Did not bring log book, but reported are typically as following: FBS- 80's, PPG- 130's- 140's. \par Hypoglycemia frequency: reports one episode of  subjective hypoglycemia when going to the gym\par Fingerstick glucose in the office today is 118 mg/dL 5 hours after eating. \par Diet: tries to monitor CHO, increased fiber in the diet. \par Exercise: none\par \par Thyroid US (7/13/18)- enlarged.  multiple b/l subcm nodules, including RMP hypo 0.4x0.5x0.3 (stable); LMP 0.4x0.2x0.3 hypo (stable)\par Thyroid US (7/18/17)- enlarge. multiple b/l subcm nodules, including RMP hypo 0.5x0.3x0.4 (stable); LUP hypo 0.5x0.3x0.5 (stable)\par Thyroid US (3/21/16)- right superior 0.8 hypo nodule, LMP 0.4 hypo nodule- both are stable\par Thyroid US (4/15/15) -  right superior 0.5 hypo nodule, no left sided nodules\par Thyroid US (1/29/10)- right superior 0.4 hypo nodule, RMP 0.3 +0.4 hypo; LUP hypo 0.3, LUMP hypo 0.3- both described as stable c/w study from  2009\par \par Lab review: from 08/18- a1c- 6.5, LDL- 155\par \par \par ****\par \par Last dilated eye -   (04/21)\par Last podiatry visit  - 5/21 ([facility])\par Last cardiology evaluation - Dr. Mendenhall \par Last stress test - 2015\par Last 2-D Echo - 2015\par Last nephrology evaluation - none\par Last neurology evaluation- none\par

## 2023-03-03 ENCOUNTER — RX RENEWAL (OUTPATIENT)
Age: 58
End: 2023-03-03

## 2023-03-14 ENCOUNTER — RX RENEWAL (OUTPATIENT)
Age: 58
End: 2023-03-14

## 2023-03-15 ENCOUNTER — APPOINTMENT (OUTPATIENT)
Dept: INTERNAL MEDICINE | Facility: CLINIC | Age: 58
End: 2023-03-15
Payer: COMMERCIAL

## 2023-03-15 VITALS — SYSTOLIC BLOOD PRESSURE: 125 MMHG | DIASTOLIC BLOOD PRESSURE: 80 MMHG

## 2023-03-15 VITALS
TEMPERATURE: 98.6 F | RESPIRATION RATE: 16 BRPM | OXYGEN SATURATION: 96 % | HEIGHT: 61 IN | BODY MASS INDEX: 32.66 KG/M2 | HEART RATE: 92 BPM | WEIGHT: 173 LBS

## 2023-03-15 DIAGNOSIS — H66.90 OTITIS MEDIA, UNSPECIFIED, UNSPECIFIED EAR: ICD-10-CM

## 2023-03-15 DIAGNOSIS — J01.00 ACUTE MAXILLARY SINUSITIS, UNSPECIFIED: ICD-10-CM

## 2023-03-15 PROCEDURE — 99214 OFFICE O/P EST MOD 30 MIN: CPT

## 2023-03-15 NOTE — REVIEW OF SYSTEMS
[Earache] : earache [Sore Throat] : sore throat [Postnasal Drip] : postnasal drip [Cough] : cough [Negative] : Psychiatric [Hearing Loss] : no hearing loss [Nosebleed] : no nosebleeds [Hoarseness] : no hoarseness [Nasal Discharge] : no nasal discharge [FreeTextEntry4] : sinus/nasal congestion

## 2023-03-15 NOTE — HEALTH RISK ASSESSMENT
[No] : No [No falls in past year] : Patient reported no falls in the past year [0] : 2) Feeling down, depressed, or hopeless: Not at all (0) [PHQ-2 Negative - No further assessment needed] : PHQ-2 Negative - No further assessment needed [Former] : Former [[id number]] : 0

## 2023-03-15 NOTE — ASSESSMENT
[FreeTextEntry1] : # acute Maxillary Sinusitis\par #Otitis Media b/l\par - Augmentin bid for 7 days with food\par - take medication with food\par - Tylenol/ for fever body aches\par - rest drink plenty of fluids\par - no improvement 3-4 days return to office/tele visit/urgent care\par \par #HTN\par continue losartan 50mg qd\par DASH DIET \par Exercise.\par Lower your salt intake.\par Reduce your alcohol consumption.\par Learn relaxation methods.\par Eating is a very important part of controlling blood pressure. Recommend Total salt intake to 2.4g/day.\par Do not add salt while preparing or eating your meals.\par Try to avoid red meats, sweets and sugar containing beverages.\par Ensure you are eating 5 fruits and vegetables a day as well as whole grains.\par \par #HL\par continue pravastatin 10mg qhs\par Compliant w/ medication\par No side effects\par Follow low cholesterol diet\par Discussed healthy diet and lifestyle.\par Avoid tobacco. \par Get more exercise. Try to get about 150 minutes of physical activity every week, or about 30 minutes per day for most days of the week.\par Keep a healthy weight. Losing even 10% of your body weight makes a difference in your cholesterol levels.\par Reduce the effect of negative emotions, Learn healthy ways to deal with anger, stress or other negative emotions.\par Replace saturated fat and trans fat with healthier fats.\par Bad Fats - margarine, french fries, doughnuts, cookies, pastries, crackers, processed meats, canola oil and hydrogenated oils\par Good Fats/Unsaturated fats - avocados, eggs (with out the yellow), coconut oil, Raw Nuts, [place], Flaxseed, Leafy green veg, organic butter, organic nut \par Eat heart-healthy foods like fruits, vegetables, poultry, fish and whole grains. Limit red meat, sugary products and dairy products made with whole milk.\par \par #TypeIIDM\par Continue monitoring FSG and or CGM. Encouraged to count carbohydrates, exercise daily and examine feet daily. If FSG/CGM are consistently greater than 300 or less than 70, patient should call MD. The patient was instructed to eat regularly without skipping meals.  \par Continue ozempic 1mg qweekly, metformin 1000mg bid and jardiance 10mg \par \par pt agrees and understands plan via teach back method. all questions answered.\par \par \par

## 2023-03-15 NOTE — HISTORY OF PRESENT ILLNESS
[FreeTextEntry8] : 57 F hx of HTN, HL, TypeIIDM \par presents with a 3-4 days of URI symptoms including:\par Rhinitis, productive cough, sinus congestion, \par Denies throat irritation\par Denies fever, body aches \par mild earache/ear pressure\par Denies SOB or wheezing or CP or palpitations\par Denies NVD\par Denies HA\par + covid vaccinated\par denies any recent travel\par denies any recent sick contacts\par denies any loss of taste or smell.\par covid test negative this morning\par Pt is monitoring blood sugar - last a1c from 2/23 - 6.5%,\par , Triglycerides 153\par CR 0.68\par tolerating statin and losartan well along with ozempic 1mg qweekly, metformin 1000mg bid and jardiance 10mg \par Pt has no acute complaints.\par \par

## 2023-03-15 NOTE — PHYSICAL EXAM
[No Lymphadenopathy] : no lymphadenopathy [Normal] : affect was normal and insight and judgment were intact [de-identified] : no erythema, no tonsillar enlargement bilaterally, no exudate bilaterally, uvula midline. +PND, + erythema in b/l ear canal

## 2023-04-27 ENCOUNTER — RX RENEWAL (OUTPATIENT)
Age: 58
End: 2023-04-27

## 2023-06-12 ENCOUNTER — RX RENEWAL (OUTPATIENT)
Age: 58
End: 2023-06-12

## 2023-06-13 ENCOUNTER — RX RENEWAL (OUTPATIENT)
Age: 58
End: 2023-06-13

## 2023-07-03 ENCOUNTER — RX RENEWAL (OUTPATIENT)
Age: 58
End: 2023-07-03

## 2023-07-24 ENCOUNTER — NON-APPOINTMENT (OUTPATIENT)
Age: 58
End: 2023-07-24

## 2023-07-24 ENCOUNTER — APPOINTMENT (OUTPATIENT)
Dept: INTERNAL MEDICINE | Facility: CLINIC | Age: 58
End: 2023-07-24
Payer: COMMERCIAL

## 2023-07-24 VITALS
OXYGEN SATURATION: 97 % | HEIGHT: 61 IN | SYSTOLIC BLOOD PRESSURE: 116 MMHG | TEMPERATURE: 97.7 F | DIASTOLIC BLOOD PRESSURE: 77 MMHG | RESPIRATION RATE: 16 BRPM | HEART RATE: 73 BPM | BODY MASS INDEX: 32.66 KG/M2 | WEIGHT: 173 LBS

## 2023-07-24 DIAGNOSIS — Z11.1 ENCOUNTER FOR SCREENING FOR RESPIRATORY TUBERCULOSIS: ICD-10-CM

## 2023-07-24 DIAGNOSIS — Z87.891 PERSONAL HISTORY OF NICOTINE DEPENDENCE: ICD-10-CM

## 2023-07-24 DIAGNOSIS — Z12.31 ENCOUNTER FOR SCREENING MAMMOGRAM FOR MALIGNANT NEOPLASM OF BREAST: ICD-10-CM

## 2023-07-24 DIAGNOSIS — Z29.9 ENCOUNTER FOR PROPHYLACTIC MEASURES, UNSPECIFIED: ICD-10-CM

## 2023-07-24 DIAGNOSIS — Z13.31 ENCOUNTER FOR SCREENING FOR DEPRESSION: ICD-10-CM

## 2023-07-24 DIAGNOSIS — Z82.49 FAMILY HISTORY OF ISCHEMIC HEART DISEASE AND OTHER DISEASES OF THE CIRCULATORY SYSTEM: ICD-10-CM

## 2023-07-24 DIAGNOSIS — Z12.11 ENCOUNTER FOR SCREENING FOR MALIGNANT NEOPLASM OF COLON: ICD-10-CM

## 2023-07-24 DIAGNOSIS — Z12.2 ENCOUNTER FOR SCREENING FOR MALIGNANT NEOPLASM OF RESPIRATORY ORGANS: ICD-10-CM

## 2023-07-24 DIAGNOSIS — Z00.00 ENCOUNTER FOR GENERAL ADULT MEDICAL EXAMINATION W/OUT ABNORMAL FINDINGS: ICD-10-CM

## 2023-07-24 PROCEDURE — 93000 ELECTROCARDIOGRAM COMPLETE: CPT

## 2023-07-24 PROCEDURE — 99396 PREV VISIT EST AGE 40-64: CPT | Mod: 25

## 2023-07-24 PROCEDURE — 36415 COLL VENOUS BLD VENIPUNCTURE: CPT

## 2023-07-24 RX ORDER — AMOXICILLIN AND CLAVULANATE POTASSIUM 875; 125 MG/1; MG/1
875-125 TABLET, COATED ORAL
Qty: 14 | Refills: 0 | Status: COMPLETED | COMMUNITY
Start: 2023-03-15 | End: 2023-07-24

## 2023-07-24 NOTE — ASSESSMENT
[FreeTextEntry1] : #CPE\par f/u blood and urine\par ekg - NSR no acute significant st or t wave changes - interpreted and reviewed results with pt.\par immunizations - will get shingles at pharmacy, PPSV 23 received\par I spent 5 minutes with the patient conducting a screen using approved screening tool (PHQ2) and discussing results of said screen with patient during this encounter.\par The patient was counseled to get regular exercise and sleep, wear sunblock and wear a safety belt in the car.\par Check CBC, CMP Hgba1c , TSH and UA\par Pap Smear\par Mammo\par Colonoscopy \par Ophtho\par Derm\par Dental\par Lung Cancer Screening\par Hx of hearing loss f/u ENT and audiology \par \par #HTN\par Continue Medication\par DASH DIET \par Exercise.\par Lower your salt intake.\par Reduce your alcohol consumption.\par Learn relaxation methods.\par Eating is a very important part of controlling blood pressure. Recommend Total salt intake to 2.4g/day.\par Do not add salt while preparing or eating your meals.\par Try to avoid red meats, sweets and sugar containing beverages.\par Ensure you are eating 5 fruits and vegetables a day as well as whole grains.\par \par #TypeIIDM\par statin and losartan well along with ozempic 1mg qweekly, metformin 1000mg bid and jardiance 10mg \par Continue monitoring FSG and or CGM. Encouraged to count carbohydrates, exercise daily and examine feet daily. If FSG/CGM are consistently greater than 300 or less than 70, patient should call MD. The patient was instructed to eat regularly without skipping meals.  \par Continue\par Check hgba1c\par Check umicroalb\par See ophtho\par Fasting blood glucose < 130\par Postprandial blood glucose < 160\par \par #Hearing loss \par - f/u ENT\par \par #HL\par Continue current management\par Compliant w/ medication\par No side effects\par Follow low cholesterol diet\par Discussed healthy diet and lifestyle.\par Avoid tobacco. \par Get more exercise. Try to get about 150 minutes of physical activity every week, or about 30 minutes per day for most days of the week.\par Keep a healthy weight. Losing even 10% of your body weight makes a difference in your cholesterol levels.\par Reduce the effect of negative emotions, Learn healthy ways to deal with anger, stress or other negative emotions.\par Replace saturated fat and trans fat with healthier fats.\par Bad Fats - margarine, french fries, doughnuts, cookies, pastries, crackers, processed meats, canola oil and hydrogenated oils\par Good Fats/Unsaturated fats - avocados, eggs (with out the yellow), coconut oil, Raw Nuts, [place], Flaxseed, Leafy green veg, organic butter, organic nut \par Eat heart-healthy foods like fruits, vegetables, poultry, fish and whole grains. Limit red meat, sugary products and dairy products made with whole milk.\par \par #TB screening\par f/u [facility]\par denies a cough lasting longer than three weeks, unexplained weight loss, chills, night sweats or a fever, and loss of appetite.\par \par #Thyroid Cysts\par following Endo Dr. Ocasio\par \par pt agrees and understands plan via teach back method. all questions answered.\par

## 2023-07-24 NOTE — HISTORY OF PRESENT ILLNESS
[de-identified] : 58 year F here for Complete Physical Exam.\par Pt is daily exercising?  No\par Vaccinations:\par covid - fully vaccinated\par flu - will get this fall \par tdap - got in past 10 years\par shingles - will get at pharmacy \par PCV -PPSV 23\par \par Screening:\par last colonoscopy: due for Dr. Daquan Horn [place]  last 2016 - hx of polyps due for one\par denies any unintentional weight loss, blood in stool, anemia or dysphagia of solids or liquids\par paternal grandmother - colon cancer\par \par obgyn: Dr Adela Joaquin | [facility]\par LMP: postmenopausal \par last pap: due for pap - has appt with obgyn\par last mammo: due for pap - has appt with obgyn\par Pregnancy:  - two boys \par 1 cousin had breast cancer - maternal \par \par -Past Medical History: \par Benign essential hypertension \par Cervical disc herniation \par Depression \par Diabetes mellitus \par Gout\par Hyperlipidemia\par Lumbar disc disease \par Multinodular goiter \par Thyroid cyst \par Uncontrolled type 2 diabetes mellitus with hyperglycemia \par \par -Allergies: NKDA\par \par -Medications:\par Aspirin 81 MG TABS; TAKE 1 TABLET DAILY\par Flonase 50 MCG/ACT SUSP\par FreeStyle Lite Test In Vitro Strip; USE TO TEST BLOOD GLUCOSE LEVELS ONCE DAILY\par Jardiance 10 MG Oral Tablet; TAKE ONE (1) TABLET BY MOUTH ONCE DAILY\par Losartan Potassium 50 MG Oral Tablet; TAKE ONE (1) TABLET BY MOUTH ONCE DAILY\par metFORMIN HCl - 1000 MG Oral Tablet; TAKE 1 TABLET BY MOUTH TWICE DAILY\par Montelukast Sodium 10 MG Oral Tablet; TAKE 1 TABLET IN THE EVENING\par Ozempic (1 MG/DOSE) 4 MG/3ML Subcutaneous Solution Pen-injector; 1 mg SQ weekly\par as directed\par Pravastatin Sodium 10 MG Oral Tablet; TAKE 1 TABLET BY MOUTH EVERYDAY AT\par BEDTIME\par Turmeric Curcumin Oral Capsule\par Vitamin D (Ergocalciferol) CAPS; TAKE 1 CAPSULE Daily\par -Surgical Hx:\par - C- section\par - Pilondial cyst \par \par -Family Hx:\par Mother -arthritis -  2/2 to respiratory issues\par Father - heart disease\par Maternal Grandfather - heart disease - pna?\par Maternal Uncle - bile duct/pancreatic cancer\par Maternal Grandmother - typeIIDM -  \par Paternal Grandfather: heart disease  \par Paternal Grandmother: unknown \par \par Siblings:\par 1 brother - lymphoma -  due to blood clots\par 1 sister healthy \par Children: 2 sons healthy \par : healthy   \par pt denies any family hx of cervical, endometrial, uterine, ovarian,  lung, prostate  or testicular cancer\par \par -Social\par ETOH - socially \par Smoker - former quit  at age approx 1 pack a day > 20 years\par Illicit Drug use - denies\par \par -Occupation: \par \par Pt has no acute complaints\par \par \par

## 2023-07-24 NOTE — PHYSICAL EXAM
[No Acute Distress] : no acute distress [Normal Sclera/Conjunctiva] : normal sclera/conjunctiva [PERRL] : pupils equal round and reactive to light [EOMI] : extraocular movements intact [No Lymphadenopathy] : no lymphadenopathy [Normal Oropharynx] : the oropharynx was normal [No Respiratory Distress] : no respiratory distress  [No Accessory Muscle Use] : no accessory muscle use [Clear to Auscultation] : lungs were clear to auscultation bilaterally [No Edema] : there was no peripheral edema [Declined Breast Exam] : declined breast exam  [Declined Rectal Exam] : declined rectal exam [Normal Posterior Cervical Nodes] : no posterior cervical lymphadenopathy [Normal Anterior Cervical Nodes] : no anterior cervical lymphadenopathy [Normal] : affect was normal and insight and judgment were intact [No CVA Tenderness] : no CVA  tenderness [No Spinal Tenderness] : no spinal tenderness [Normal Outer Ear/Nose] : the outer ears and nose were normal in appearance [de-identified] : diminished hearing  [de-identified] : RRR [de-identified] : declined [de-identified] : mood stable denies si/hi

## 2023-07-24 NOTE — REVIEW OF SYSTEMS
[Negative] : Heme/Lymph [Hearing Loss] : hearing loss [Earache] : no earache [Nosebleed] : no nosebleeds [Hoarseness] : no hoarseness [Nasal Discharge] : no nasal discharge [Sore Throat] : no sore throat [Postnasal Drip] : no postnasal drip [FreeTextEntry4] : hearing loss

## 2023-07-24 NOTE — HEALTH RISK ASSESSMENT
[No] : No [No falls in past year] : Patient reported no falls in the past year [0] : 2) Feeling down, depressed, or hopeless: Not at all (0) [PHQ-2 Negative - No further assessment needed] : PHQ-2 Negative - No further assessment needed [HIV test declined] : HIV test declined [Hepatitis C test declined] : Hepatitis C test declined [None] : None [Employed] : employed [With Family] : lives with family [Feels Safe at Home] : Feels safe at home [Fully functional (bathing, dressing, toileting, transferring, walking, feeding)] : Fully functional (bathing, dressing, toileting, transferring, walking, feeding) [Fully functional (using the telephone, shopping, preparing meals, housekeeping, doing laundry, using] : Fully functional and needs no help or supervision to perform IADLs (using the telephone, shopping, preparing meals, housekeeping, doing laundry, using transportation, managing medications and managing finances) [Reports normal functional visual acuity (ie: able to read med bottle)] : Reports normal functional visual acuity [Smoke Detector] : smoke detector [Carbon Monoxide Detector] : carbon monoxide detector [Safety elements used in home] : safety elements used in home [Seat Belt] :  uses seat belt [Sunscreen] : uses sunscreen [High School] : high school [] :  [# Of Children ___] : has [unfilled] children [Reports changes in hearing] : Reports changes in hearing [Former] : Former [[id number]] : 0 [Change in mental status noted] : No change in mental status noted [Language] : denies difficulty with language [Behavior] : denies difficulty with behavior [Learning/Retaining New Information] : denies difficulty learning/retaining new information [Handling Complex Tasks] : denies difficulty handling complex tasks [Reasoning] : denies difficulty with reasoning [Spatial Ability and Orientation] : denies difficulty with spatial ability and orientation [Sexually Active] : not sexually active [High Risk Behavior] : no high risk behavior [Reports changes in vision] : Reports no changes in vision [Reports changes in dental health] : Reports no changes in dental health [Guns at Home] : no guns at home [Travel to Developing Areas] : does not  travel to developing areas [TB Exposure] : is not being exposed to tuberculosis [Caregiver Concerns] : does not have caregiver concerns [de-identified] : quit 2005

## 2023-07-27 LAB
25(OH)D3 SERPL-MCNC: 25.3 NG/ML
ALBUMIN SERPL ELPH-MCNC: 4.6 G/DL
ALP BLD-CCNC: 88 U/L
ALT SERPL-CCNC: 16 U/L
ANION GAP SERPL CALC-SCNC: 13 MMOL/L
APPEARANCE: CLEAR
AST SERPL-CCNC: 13 U/L
BACTERIA: NEGATIVE /HPF
BILIRUB SERPL-MCNC: 1 MG/DL
BILIRUBIN URINE: NEGATIVE
BLOOD URINE: NEGATIVE
BUN SERPL-MCNC: 11 MG/DL
CALCIUM SERPL-MCNC: 9.5 MG/DL
CAST: 0 /LPF
CHLORIDE SERPL-SCNC: 104 MMOL/L
CHOLEST SERPL-MCNC: 251 MG/DL
CO2 SERPL-SCNC: 24 MMOL/L
COLOR: ABNORMAL
CREAT SERPL-MCNC: 0.67 MG/DL
CREAT SPEC-SCNC: 41 MG/DL
EGFR: 101 ML/MIN/1.73M2
EPITHELIAL CELLS: 1 /HPF
ESTIMATED AVERAGE GLUCOSE: 120 MG/DL
FOLATE SERPL-MCNC: 15.4 NG/ML
GLUCOSE QUALITATIVE U: >=1000 MG/DL
GLUCOSE SERPL-MCNC: 88 MG/DL
HBA1C MFR BLD HPLC: 5.8 %
HBV SURFACE AB SERPL IA-ACNC: <3 MIU/ML
HDLC SERPL-MCNC: 54 MG/DL
KETONES URINE: NEGATIVE MG/DL
LDLC SERPL CALC-MCNC: 158 MG/DL
LEUKOCYTE ESTERASE URINE: NEGATIVE
M TB IFN-G BLD-IMP: NEGATIVE
MICROALBUMIN 24H UR DL<=1MG/L-MCNC: <1.2 MG/DL
MICROALBUMIN/CREAT 24H UR-RTO: NORMAL MG/G
MICROSCOPIC-UA: NORMAL
NITRITE URINE: NEGATIVE
NONHDLC SERPL-MCNC: 197 MG/DL
PH URINE: 6
POTASSIUM SERPL-SCNC: 4.8 MMOL/L
PROT SERPL-MCNC: 7.1 G/DL
PROTEIN URINE: NEGATIVE MG/DL
QUANTIFERON TB PLUS MITOGEN MINUS NIL: 8.67 IU/ML
QUANTIFERON TB PLUS NIL: 0.01 IU/ML
QUANTIFERON TB PLUS TB1 MINUS NIL: 0 IU/ML
QUANTIFERON TB PLUS TB2 MINUS NIL: 0 IU/ML
RED BLOOD CELLS URINE: 0 /HPF
SODIUM SERPL-SCNC: 141 MMOL/L
SPECIFIC GRAVITY URINE: 1.01
TRIGL SERPL-MCNC: 211 MG/DL
TSH SERPL-ACNC: 0.91 UIU/ML
UROBILINOGEN URINE: 0.2 MG/DL
VIT B12 SERPL-MCNC: 575 PG/ML
WHITE BLOOD CELLS URINE: 0 /HPF

## 2023-07-28 ENCOUNTER — NON-APPOINTMENT (OUTPATIENT)
Age: 58
End: 2023-07-28

## 2023-08-04 ENCOUNTER — APPOINTMENT (OUTPATIENT)
Dept: CT IMAGING | Facility: CLINIC | Age: 58
End: 2023-08-04

## 2023-08-17 ENCOUNTER — RX RENEWAL (OUTPATIENT)
Age: 58
End: 2023-08-17

## 2023-08-22 ENCOUNTER — RX RENEWAL (OUTPATIENT)
Age: 58
End: 2023-08-22

## 2023-08-25 ENCOUNTER — APPOINTMENT (OUTPATIENT)
Dept: CARDIOLOGY | Facility: CLINIC | Age: 58
End: 2023-08-25

## 2023-11-03 ENCOUNTER — RX RENEWAL (OUTPATIENT)
Age: 58
End: 2023-11-03

## 2023-12-06 ENCOUNTER — APPOINTMENT (OUTPATIENT)
Dept: ENDOCRINOLOGY | Facility: CLINIC | Age: 58
End: 2023-12-06

## 2023-12-09 ENCOUNTER — APPOINTMENT (OUTPATIENT)
Dept: MAMMOGRAPHY | Facility: CLINIC | Age: 58
End: 2023-12-09

## 2023-12-09 ENCOUNTER — APPOINTMENT (OUTPATIENT)
Dept: ULTRASOUND IMAGING | Facility: CLINIC | Age: 58
End: 2023-12-09

## 2023-12-20 ENCOUNTER — RX RENEWAL (OUTPATIENT)
Age: 58
End: 2023-12-20

## 2023-12-21 RX ORDER — MONTELUKAST 10 MG/1
10 TABLET, FILM COATED ORAL
Qty: 30 | Refills: 0 | Status: ACTIVE | COMMUNITY
Start: 2022-11-20 | End: 1900-01-01

## 2024-01-04 ENCOUNTER — RX RENEWAL (OUTPATIENT)
Age: 59
End: 2024-01-04

## 2024-01-30 ENCOUNTER — APPOINTMENT (OUTPATIENT)
Dept: ENDOCRINOLOGY | Facility: CLINIC | Age: 59
End: 2024-01-30
Payer: COMMERCIAL

## 2024-01-30 VITALS
BODY MASS INDEX: 33.42 KG/M2 | SYSTOLIC BLOOD PRESSURE: 120 MMHG | OXYGEN SATURATION: 97 % | HEIGHT: 61 IN | RESPIRATION RATE: 16 BRPM | TEMPERATURE: 97.7 F | WEIGHT: 177 LBS | HEART RATE: 82 BPM | DIASTOLIC BLOOD PRESSURE: 72 MMHG

## 2024-01-30 LAB — GLUCOSE BLDC GLUCOMTR-MCNC: 121

## 2024-01-30 PROCEDURE — 99214 OFFICE O/P EST MOD 30 MIN: CPT | Mod: 25

## 2024-01-30 PROCEDURE — 82962 GLUCOSE BLOOD TEST: CPT

## 2024-01-30 RX ORDER — SEMAGLUTIDE 1.34 MG/ML
4 INJECTION, SOLUTION SUBCUTANEOUS
Qty: 3 | Refills: 3 | Status: ACTIVE | COMMUNITY
Start: 2021-11-08 | End: 1900-01-01

## 2024-01-30 RX ORDER — METFORMIN HYDROCHLORIDE 1000 MG/1
1000 TABLET, COATED ORAL
Qty: 180 | Refills: 3 | Status: ACTIVE | COMMUNITY
Start: 2021-11-08 | End: 1900-01-01

## 2024-01-30 RX ORDER — PRAVASTATIN SODIUM 20 MG/1
20 TABLET ORAL
Qty: 90 | Refills: 3 | Status: ACTIVE | COMMUNITY
Start: 2023-02-27 | End: 1900-01-01

## 2024-01-30 NOTE — HISTORY OF PRESENT ILLNESS
[FreeTextEntry1] : F/u for diabetes management and MNG.  *** Jan 30, 2024 ***  missed f/u visit feels well, no new c/o Metformin 1000 mg bid,  Ozempic 1 mg qw,  Jardiance 10 mg qd, Pravastatin 10 mg HS (tolerates well) reports fbs- upto 110, ppg-  not checking last a1c- 5.8%  *** Feb 27, 2023 ***  feels well. weight is stable Metformin 1000 mg bid,  Ozempic 0.5 mg qw,  Jardiance 10 mg qd, Crestor 5 mg tiw only b/o myalgia reports fbs- 90- 120 a1c- 6.5, LDL- 126  *** Apr 14, 2022 ***  feels great, lost 17 lbs on ozempic Metformin 1000 mg bid,  Ozempic 0.5 mg qw,  Jardiance 10 mg qd not checking her FS consistently labs from 3/26/22- a1c- 6.0, LDL- 68  Thyroid ultrasound (11/27/2021)-bilateral subcentimeter thyroid nodules, including right midpole solid 0.5 x 0.3 x 0.4 cm nodule, left upper pole 0.4 x 0.3 x 0.5 solid nodule, and left midpole 0.4 x 0.3 x 0.3 solid nodule.  *** Nov 08, 2021 ***  again, missed f/u visits on jentadueto 2.5/ 500 mg bid, crestor 5 mg qw at most, CoQ10 300 mg qd labs from 10/31/21-  a1c- 7.7 reports fbs- 160's, ppg- not checking  *** Jul 08, 2020 ***  missed f/u appts feels well. denies any c/o on jentadueto 2.5/ 500 mg bid, crestor 5 mg qw at most, CoQ10 300 mg qd  a1c- 6.4, LDL- 135, TC- 231  HISTORY OF PRESENT ILLNESS.   Ms. BENSON was diagnosed with Diabetes Mellitus Type 2 in 2003 Denies known complications of retinopathy, nephropathy, or neuropathy.  Reports history obesity, HTN, dyslipidemia, MNG. Denies CAD. She underwent a lap-band in 2014; initially lost about 40 lbs, but regained 15 lbs since then Presently on glipizide XL 5mg only. She is also on losartan 50mg qd, crestor 5mg Blood sugars are checked 2 times a day.  Did not bring log book, but reported are typically as following: FBS- 80's, PPG- 130's- 140's.  Hypoglycemia frequency: reports one episode of  subjective hypoglycemia when going to the gym Fingerstick glucose in the office today is 118 mg/dL 5 hours after eating.  Diet: tries to monitor CHO, increased fiber in the diet.  Exercise: none  Thyroid US (7/13/18)- enlarged.  multiple b/l subcm nodules, including RMP hypo 0.4x0.5x0.3 (stable); LMP 0.4x0.2x0.3 hypo (stable) Thyroid US (7/18/17)- enlarge. multiple b/l subcm nodules, including RMP hypo 0.5x0.3x0.4 (stable); LUP hypo 0.5x0.3x0.5 (stable) Thyroid US (3/21/16)- right superior 0.8 hypo nodule, LMP 0.4 hypo nodule- both are stable Thyroid US (4/15/15) -  right superior 0.5 hypo nodule, no left sided nodules Thyroid US (1/29/10)- right superior 0.4 hypo nodule, RMP 0.3 +0.4 hypo; LUP hypo 0.3, LUMP hypo 0.3- both described as stable c/w study from  2009  Lab review: from 08/18- a1c- 6.5, LDL- 155   ****  Last dilated eye - 09/23 ([facility]) Last podiatry visit  - 5/21 Last cardiology evaluation - Dr. Mendenhall  Last stress test - 2015 Last 2-D Echo - 2015 Last nephrology evaluation - none Last neurology evaluation- none

## 2024-01-30 NOTE — QUALITY MEASURES
Podiatry Pager #: [phone]    Patient is a 58y old  Male who presents with a chief complaint of pain/discharge from left small toe amputation site (24 Aug 2022 16:48)      INTERVAL HPI/OVERNIGHT EVENTS:   Pt is scheduled for LEFT foot Partial Fourth Ray Resection with Dr. Key at 1500 . Patient is aware of procedure and is NPO since midnight.    MEDICATIONS  (STANDING):  amLODIPine   Tablet 5 milliGRAM(s) Oral daily  aspirin  chewable 81 milliGRAM(s) Oral daily  atorvastatin 80 milliGRAM(s) Oral at bedtime  carvedilol 12.5 milliGRAM(s) Oral every 12 hours  dextrose 5%. 1000 milliLiter(s) (50 mL/Hr) IV Continuous <Continuous>  dextrose 5%. 1000 milliLiter(s) (100 mL/Hr) IV Continuous <Continuous>  dextrose 50% Injectable 25 Gram(s) IV Push once  dextrose 50% Injectable 12.5 Gram(s) IV Push once  dextrose 50% Injectable 25 Gram(s) IV Push once  ertapenem  IVPB 1000 milliGRAM(s) IV Intermittent every 24 hours  ferrous    sulfate 325 milliGRAM(s) Oral daily  folic acid 1 milliGRAM(s) Oral daily  furosemide    Tablet 40 milliGRAM(s) Oral daily  gabapentin 100 milliGRAM(s) Oral three times a day  glucagon  Injectable 1 milliGRAM(s) IntraMuscular once  insulin glargine Injectable (LANTUS) 9 Unit(s) SubCutaneous at bedtime  insulin lispro (ADMELOG) corrective regimen sliding scale   SubCutaneous three times a day before meals  insulin lispro (ADMELOG) corrective regimen sliding scale   SubCutaneous at bedtime  insulin lispro Injectable (ADMELOG) 5 Unit(s) SubCutaneous three times a day before meals  levothyroxine 100 MICROGram(s) Oral daily  losartan 25 milliGRAM(s) Oral daily  senna 2 Tablet(s) Oral at bedtime  vancomycin  IVPB 1250 milliGRAM(s) IV Intermittent every 12 hours    MEDICATIONS  (PRN):  acetaminophen     Tablet .. 975 milliGRAM(s) Oral every 8 hours PRN Temp greater or equal to 38C (100.4F), Mild Pain (1 - 3)  dextrose Oral Gel 15 Gram(s) Oral once PRN Blood Glucose LESS THAN 70 milliGRAM(s)/deciliter  traMADol 50 milliGRAM(s) Oral every 6 hours PRN Severe Pain (7 - 10)      Allergies    No Known Allergies    Intolerances        Vital Signs Last 24 Hrs  T(C): 36.4 (25 Aug 2022 05:35), Max: 37.1 (24 Aug 2022 12:39)  T(F): 97.6 (25 Aug 2022 05:35), Max: 98.7 (24 Aug 2022 12:39)  HR: 78 (25 Aug 2022 05:35) (70 - 88)  BP: 157/88 (25 Aug 2022 05:35) (147/78 - 159/90)  BP(mean): --  RR: 17 (25 Aug 2022 05:35) (17 - 18)  SpO2: 99% (25 Aug 2022 05:35) (99% - 100%)    Parameters below as of 25 Aug 2022 05:35  Patient On (Oxygen Delivery Method): room air        LABS:                        10.2   7.88  )-----------( 410      ( 24 Aug 2022 06:35 )             32.1     08-24    141  |  104  |  10  ----------------------------<  87  3.5   |  27  |  0.74    Ca    9.2      24 Aug 2022 06:35  Phos  3.5     08-24  Mg     1.60     08-24          CAPILLARY BLOOD GLUCOSE      POCT Blood Glucose.: 218 mg/dL (24 Aug 2022 21:08)  POCT Blood Glucose.: 193 mg/dL (24 Aug 2022 17:32)  POCT Blood Glucose.: 161 mg/dL (24 Aug 2022 12:29)  POCT Blood Glucose.: 128 mg/dL (24 Aug 2022 08:34)      RADIOLOGY & ADDITIONAL TESTS:    Plan:   To OR today at 1500 with Dr. Key for LEFT foot Partial Fourth Ray Resection.   CXR on sunrise.  EKG on sunrise.  Medical clearance since 8/24 and documented in chart.  Consent signed and in chart.  Procedure was explained to patient in detail. All alternatives, risks and complications were discussed. All questions answered. Podiatry Pager #: [phone]    Patient is a 58y old  Male who presents with a chief complaint of pain/discharge from left small toe amputation site (24 Aug 2022 16:48)      INTERVAL HPI/OVERNIGHT EVENTS:   Pt is scheduled for LEFT foot Partial Fourth Ray Resection with Dr. Key at 1300 . Patient is aware of procedure and is NPO since midnight.    MEDICATIONS  (STANDING):  amLODIPine   Tablet 5 milliGRAM(s) Oral daily  aspirin  chewable 81 milliGRAM(s) Oral daily  atorvastatin 80 milliGRAM(s) Oral at bedtime  carvedilol 12.5 milliGRAM(s) Oral every 12 hours  dextrose 5%. 1000 milliLiter(s) (50 mL/Hr) IV Continuous <Continuous>  dextrose 5%. 1000 milliLiter(s) (100 mL/Hr) IV Continuous <Continuous>  dextrose 50% Injectable 25 Gram(s) IV Push once  dextrose 50% Injectable 12.5 Gram(s) IV Push once  dextrose 50% Injectable 25 Gram(s) IV Push once  ertapenem  IVPB 1000 milliGRAM(s) IV Intermittent every 24 hours  ferrous    sulfate 325 milliGRAM(s) Oral daily  folic acid 1 milliGRAM(s) Oral daily  furosemide    Tablet 40 milliGRAM(s) Oral daily  gabapentin 100 milliGRAM(s) Oral three times a day  glucagon  Injectable 1 milliGRAM(s) IntraMuscular once  insulin glargine Injectable (LANTUS) 9 Unit(s) SubCutaneous at bedtime  insulin lispro (ADMELOG) corrective regimen sliding scale   SubCutaneous three times a day before meals  insulin lispro (ADMELOG) corrective regimen sliding scale   SubCutaneous at bedtime  insulin lispro Injectable (ADMELOG) 5 Unit(s) SubCutaneous three times a day before meals  levothyroxine 100 MICROGram(s) Oral daily  losartan 25 milliGRAM(s) Oral daily  senna 2 Tablet(s) Oral at bedtime  vancomycin  IVPB 1250 milliGRAM(s) IV Intermittent every 12 hours    MEDICATIONS  (PRN):  acetaminophen     Tablet .. 975 milliGRAM(s) Oral every 8 hours PRN Temp greater or equal to 38C (100.4F), Mild Pain (1 - 3)  dextrose Oral Gel 15 Gram(s) Oral once PRN Blood Glucose LESS THAN 70 milliGRAM(s)/deciliter  traMADol 50 milliGRAM(s) Oral every 6 hours PRN Severe Pain (7 - 10)      Allergies    No Known Allergies    Intolerances        Vital Signs Last 24 Hrs  T(C): 36.4 (25 Aug 2022 05:35), Max: 37.1 (24 Aug 2022 12:39)  T(F): 97.6 (25 Aug 2022 05:35), Max: 98.7 (24 Aug 2022 12:39)  HR: 78 (25 Aug 2022 05:35) (70 - 88)  BP: 157/88 (25 Aug 2022 05:35) (147/78 - 159/90)  BP(mean): --  RR: 17 (25 Aug 2022 05:35) (17 - 18)  SpO2: 99% (25 Aug 2022 05:35) (99% - 100%)    Parameters below as of 25 Aug 2022 05:35  Patient On (Oxygen Delivery Method): room air        LABS:                        10.2   7.88  )-----------( 410      ( 24 Aug 2022 06:35 )             32.1     08-24    141  |  104  |  10  ----------------------------<  87  3.5   |  27  |  0.74    Ca    9.2      24 Aug 2022 06:35  Phos  3.5     08-24  Mg     1.60     08-24          CAPILLARY BLOOD GLUCOSE      POCT Blood Glucose.: 218 mg/dL (24 Aug 2022 21:08)  POCT Blood Glucose.: 193 mg/dL (24 Aug 2022 17:32)  POCT Blood Glucose.: 161 mg/dL (24 Aug 2022 12:29)  POCT Blood Glucose.: 128 mg/dL (24 Aug 2022 08:34)      RADIOLOGY & ADDITIONAL TESTS:    Plan:   To OR today at 1500 with Dr. Key for LEFT foot Partial Fourth Ray Resection.   CXR on sunrise.  EKG on sunrise.  Medical clearance since 8/24 and documented in chart.  Consent signed and in chart.  Procedure was explained to patient in detail. All alternatives, risks and complications were discussed. All questions answered. [Visual inspection, sensory exam] : Foot exam, including visual inspection, sensory exam with mono filament, and pulse exam, was performed within the last 12 months

## 2024-01-30 NOTE — ASSESSMENT
[Diabetes Foot Care] : diabetes foot care [Long Term Vascular Complications] : long term vascular complications of diabetes [Carbohydrate Consistent Diet] : carbohydrate consistent diet [Importance of Diet and Exercise] : importance of diet and exercise to improve glycemic control, achieve weight loss and improve cardiovascular health [Exercise/Effect on Glucose] : exercise/effect on glucose [Hypoglycemia Management] : hypoglycemia management [Glucagon Use] : glucagon use [Self Monitoring of Blood Glucose] : self monitoring of blood glucose [Injection Technique, Storage, Sharps Disposal] : injection technique, storage, and sharps disposal [Retinopathy Screening] : Patient was referred to ophthalmology for retinopathy screening [Diabetic Medications] : Risks and benefits of diabetic medications were discussed [FreeTextEntry1] : 1. T2DM, well controlled - compliance is reiterated - Metformin 1000 mg bid - Ozempic 1 mg qw. Patient does not want to increase the dose at present - Jardiance 10 mg qd - Pravastatin 10 mg HS, continue CoQ10 300 mg qd - vitamin D3 5000 IU/day - labs this week with PCP  2. MNG - bilateral subcm nodules appear stable for the past 8+ years. - repeat another sonogram in 11/24 ( 3 years)  RTC 6 mos, if stable

## 2024-01-31 RX ORDER — BLOOD SUGAR DIAGNOSTIC
STRIP MISCELLANEOUS
Qty: 100 | Refills: 3 | Status: ACTIVE | COMMUNITY
Start: 2024-01-31 | End: 1900-01-01

## 2024-02-01 ENCOUNTER — APPOINTMENT (OUTPATIENT)
Dept: INTERNAL MEDICINE | Facility: CLINIC | Age: 59
End: 2024-02-01
Payer: COMMERCIAL

## 2024-02-01 VITALS
OXYGEN SATURATION: 98 % | SYSTOLIC BLOOD PRESSURE: 125 MMHG | HEIGHT: 61 IN | BODY MASS INDEX: 33.23 KG/M2 | RESPIRATION RATE: 16 BRPM | TEMPERATURE: 98.5 F | HEART RATE: 76 BPM | DIASTOLIC BLOOD PRESSURE: 72 MMHG | WEIGHT: 176 LBS

## 2024-02-01 DIAGNOSIS — I10 ESSENTIAL (PRIMARY) HYPERTENSION: ICD-10-CM

## 2024-02-01 DIAGNOSIS — E78.5 HYPERLIPIDEMIA, UNSPECIFIED: ICD-10-CM

## 2024-02-01 DIAGNOSIS — E66.9 OBESITY, UNSPECIFIED: ICD-10-CM

## 2024-02-01 DIAGNOSIS — E11.65 TYPE 2 DIABETES MELLITUS WITH HYPERGLYCEMIA: ICD-10-CM

## 2024-02-01 DIAGNOSIS — E11.9 TYPE 2 DIABETES MELLITUS W/OUT COMPLICATIONS: ICD-10-CM

## 2024-02-01 PROCEDURE — 99214 OFFICE O/P EST MOD 30 MIN: CPT

## 2024-02-01 PROCEDURE — G0447 BEHAVIOR COUNSEL OBESITY 15M: CPT | Mod: 59

## 2024-02-01 PROCEDURE — 36415 COLL VENOUS BLD VENIPUNCTURE: CPT

## 2024-02-01 NOTE — PHYSICAL EXAM
[No Acute Distress] : no acute distress [Normal Sclera/Conjunctiva] : normal sclera/conjunctiva [PERRL] : pupils equal round and reactive to light [EOMI] : extraocular movements intact [Normal Outer Ear/Nose] : the outer ears and nose were normal in appearance [Normal Oropharynx] : the oropharynx was normal [No Lymphadenopathy] : no lymphadenopathy [No Respiratory Distress] : no respiratory distress  [No Accessory Muscle Use] : no accessory muscle use [Clear to Auscultation] : lungs were clear to auscultation bilaterally [No Edema] : there was no peripheral edema [Normal Posterior Cervical Nodes] : no posterior cervical lymphadenopathy [Normal Anterior Cervical Nodes] : no anterior cervical lymphadenopathy [No CVA Tenderness] : no CVA  tenderness [No Spinal Tenderness] : no spinal tenderness [Normal] : affect was normal and insight and judgment were intact [Comprehensive Foot Exam Normal] : Right and left foot were examined and both feet are normal. No ulcers in either foot. Toes are normal and with full ROM.  Normal tactile sensation with monofilament testing throughout both feet [de-identified] : diminished hearing  [de-identified] : RRR [de-identified] : declined [de-identified] : mood stable denies si/hi

## 2024-02-01 NOTE — HEALTH RISK ASSESSMENT
[No] : No [No falls in past year] : Patient reported no falls in the past year [0] : 2) Feeling down, depressed, or hopeless: Not at all (0) [PHQ-2 Negative - No further assessment needed] : PHQ-2 Negative - No further assessment needed [[id number]] : 0 [Never] : Never

## 2024-02-01 NOTE — HISTORY OF PRESENT ILLNESS
[de-identified] : 58 F  f/u a1c,  Diabetes Type 2 on metformin,ozempic, losartan, jardiance  complaints with medications on Ace/Arb No hypoglycemic events Checking Blood Sugar at home No complaints of foot pain or paresthesias seen by opthamology < 1 year ago following low carb diet  Exercising here for f/u  HTN currently on losartan 50mg qd compliant with medication does not report any headache, blurry vision, epistaxis, dizziness, chest pain, sob, or palpitations or leg swelling Following a low salt diet and exercising Pt has no further complaints.

## 2024-02-01 NOTE — ASSESSMENT
[FreeTextEntry1] : #HTN continue losartan 50mg qd DASH DIET Exercise. Lower your salt intake. Learn relaxation methods. Do not add salt while preparing or eating your meals. Try to avoid red meats, sweets and sugar containing beverages. Ensure you are eating 5 fruits and vegetables a day as well as whole grains. #HL low cholesterol diet, life style modification, increase exercise, more fruits and vegetables less sweet, carbs and starch foods continue pravatstain 20mg  #TypeIIDM Continue monitoring FSG and or CGM. Encouraged to count carbohydrates, exercise daily and examine feet daily. If FSG/CGM are consistently greater than 300 or less than 70, patient should call MD. The patient was instructed to eat regularly without skipping meals.   Continue metofrmin 1000mg bid, ozempic 1mg subq weekly, jardiance 10mg  Check hgba1c Check umicroalb See ophtho Fasting blood glucose < 130 Postprandial blood glucose < 160 f/u endo #Obesity BMI discussed Diet Modification and Increase Physical Activity Setting realistic weight loss goals, such as a one- to two-pound weight loss per week. Eating fewer calories by cutting down on portion sizes. 15 min spent on obesity discussion pt agrees and understands plan via teach back method. all questions answered. A total of 30 minutes including same day pre-visit chart review, face to face time with patient, history taking, physical examination, coordination of care, testing interpretation, and documentation was spent on this visit. f/u a1c, albumin, b12, cbc, cmp, lipid, tsh, ua and vit d

## 2024-02-05 ENCOUNTER — NON-APPOINTMENT (OUTPATIENT)
Age: 59
End: 2024-02-05

## 2024-02-05 LAB
25(OH)D3 SERPL-MCNC: 28.8 NG/ML
ALBUMIN SERPL ELPH-MCNC: 4.9 G/DL
ALP BLD-CCNC: 95 U/L
ALT SERPL-CCNC: 25 U/L
ANION GAP SERPL CALC-SCNC: 13 MMOL/L
APPEARANCE: CLEAR
AST SERPL-CCNC: 23 U/L
BACTERIA: NEGATIVE /HPF
BILIRUB SERPL-MCNC: 1 MG/DL
BILIRUBIN URINE: NEGATIVE
BLOOD URINE: NEGATIVE
BUN SERPL-MCNC: 9 MG/DL
CALCIUM SERPL-MCNC: 9.6 MG/DL
CAST: 0 /LPF
CHLORIDE SERPL-SCNC: 103 MMOL/L
CHOLEST SERPL-MCNC: 226 MG/DL
CO2 SERPL-SCNC: 24 MMOL/L
COLOR: YELLOW
CREAT SERPL-MCNC: 0.63 MG/DL
CREAT SPEC-SCNC: 80 MG/DL
EGFR: 103 ML/MIN/1.73M2
EPITHELIAL CELLS: 1 /HPF
ESTIMATED AVERAGE GLUCOSE: 134 MG/DL
FOLATE SERPL-MCNC: >20 NG/ML
GLUCOSE QUALITATIVE U: >=1000 MG/DL
GLUCOSE SERPL-MCNC: 86 MG/DL
HBA1C MFR BLD HPLC: 6.3 %
HCT VFR BLD CALC: 43.9 %
HDLC SERPL-MCNC: 63 MG/DL
HGB BLD-MCNC: 14.3 G/DL
KETONES URINE: NEGATIVE MG/DL
LDLC SERPL CALC-MCNC: 143 MG/DL
LEUKOCYTE ESTERASE URINE: NEGATIVE
MCHC RBC-ENTMCNC: 29.2 PG
MCHC RBC-ENTMCNC: 32.6 GM/DL
MCV RBC AUTO: 89.6 FL
MICROALBUMIN 24H UR DL<=1MG/L-MCNC: <1.2 MG/DL
MICROALBUMIN/CREAT 24H UR-RTO: NORMAL MG/G
MICROSCOPIC-UA: NORMAL
NITRITE URINE: NEGATIVE
NONHDLC SERPL-MCNC: 162 MG/DL
PH URINE: 5.5
PLATELET # BLD AUTO: 387 K/UL
POTASSIUM SERPL-SCNC: 4.2 MMOL/L
PROT SERPL-MCNC: 7.4 G/DL
PROTEIN URINE: NEGATIVE MG/DL
RBC # BLD: 4.9 M/UL
RBC # FLD: 13 %
RED BLOOD CELLS URINE: 0 /HPF
SODIUM SERPL-SCNC: 140 MMOL/L
SPECIFIC GRAVITY URINE: >1.03
TRIGL SERPL-MCNC: 112 MG/DL
TSH SERPL-ACNC: 0.93 UIU/ML
UROBILINOGEN URINE: 0.2 MG/DL
VIT B12 SERPL-MCNC: 497 PG/ML
WBC # FLD AUTO: 7.74 K/UL
WHITE BLOOD CELLS URINE: 1 /HPF

## 2024-02-05 RX ORDER — LANCING DEVICE/LANCETS
KIT MISCELLANEOUS
Qty: 2 | Refills: 1 | Status: ACTIVE | COMMUNITY
Start: 2024-01-31 | End: 1900-01-01

## 2024-04-22 ENCOUNTER — RX RENEWAL (OUTPATIENT)
Age: 59
End: 2024-04-22

## 2024-04-25 ENCOUNTER — RX RENEWAL (OUTPATIENT)
Age: 59
End: 2024-04-25

## 2024-04-25 RX ORDER — EMPAGLIFLOZIN 10 MG/1
10 TABLET, FILM COATED ORAL
Qty: 90 | Refills: 2 | Status: ACTIVE | COMMUNITY
Start: 2021-11-08 | End: 1900-01-01

## 2024-05-02 ENCOUNTER — APPOINTMENT (OUTPATIENT)
Dept: INTERNAL MEDICINE | Facility: CLINIC | Age: 59
End: 2024-05-02
Payer: COMMERCIAL

## 2024-05-02 VITALS
WEIGHT: 178 LBS | SYSTOLIC BLOOD PRESSURE: 120 MMHG | TEMPERATURE: 98.3 F | HEART RATE: 83 BPM | DIASTOLIC BLOOD PRESSURE: 75 MMHG | HEIGHT: 62.2 IN | BODY MASS INDEX: 32.34 KG/M2 | OXYGEN SATURATION: 97 %

## 2024-05-02 DIAGNOSIS — H66.90 OTITIS MEDIA, UNSPECIFIED, UNSPECIFIED EAR: ICD-10-CM

## 2024-05-02 DIAGNOSIS — H91.90 UNSPECIFIED HEARING LOSS, UNSPECIFIED EAR: ICD-10-CM

## 2024-05-02 DIAGNOSIS — H92.02 OTALGIA, LEFT EAR: ICD-10-CM

## 2024-05-02 PROCEDURE — 99214 OFFICE O/P EST MOD 30 MIN: CPT

## 2024-05-02 PROCEDURE — G2211 COMPLEX E/M VISIT ADD ON: CPT

## 2024-05-02 RX ORDER — AMOXICILLIN AND CLAVULANATE POTASSIUM 875; 125 MG/1; MG/1
875-125 TABLET, COATED ORAL
Qty: 14 | Refills: 0 | Status: ACTIVE | COMMUNITY
Start: 2024-05-02 | End: 1900-01-01

## 2024-05-02 NOTE — HISTORY OF PRESENT ILLNESS
[FreeTextEntry8] : 58 F who presents with  3-4 days of moderate L earache/ear pain. No fever + left neck pain, no dysphagia, hoarseness, or drooling no sore throat or sick contacts No rhinitis or nasal congestion No cough or sore throat No hearing loss No sob, wheezing, chest pain or palpitation No recent travel or sick contacts No tinnitus Hearing Loss + chronic - needs to see ENT - reports has not in a while The patient feels well otherwise + Left Ant Cervical Lymphadenopathy No Tenderness or Erythema around Jaw

## 2024-05-02 NOTE — ASSESSMENT
[FreeTextEntry1] : 1. Otitis Media L>R 2. Hearing loss Augmentin bid for 7 days w/ food Pt denies allergies to penicillin tylenol for pain f/u ENT for heairng loss and ear pain if any increase in swelling, fever, body aches, voice changes contact MD ASAP or go to ER pt agrees and understands plan via teach back method. all questions answered. A total of 30 minutes including same day pre-visit chart review, face to face time with patient, history taking, physical examination, coordination of care, testing interpretation, and documentation was spent on this visit.

## 2024-05-02 NOTE — REVIEW OF SYSTEMS
[Fever] : no fever [Chills] : no chills [Fatigue] : no fatigue [Hot Flashes] : no hot flashes [Night Sweats] : no night sweats [Recent Change In Weight] : ~T no recent weight change [Earache] : earache [Hearing Loss] : no hearing loss [Nosebleed] : no nosebleeds [Hoarseness] : no hoarseness [Nasal Discharge] : no nasal discharge [Sore Throat] : no sore throat [Postnasal Drip] : no postnasal drip [Negative] : Psychiatric

## 2024-05-02 NOTE — HEALTH RISK ASSESSMENT
[No] : No [No falls in past year] : Patient reported no falls in the past year [0] : 2) Feeling down, depressed, or hopeless: Not at all (0) [Former] : Former

## 2024-05-02 NOTE — PHYSICAL EXAM
[No Acute Distress] : no acute distress [Normal Sclera/Conjunctiva] : normal sclera/conjunctiva [Supple] : supple [No Respiratory Distress] : no respiratory distress  [No Accessory Muscle Use] : no accessory muscle use [Clear to Auscultation] : lungs were clear to auscultation bilaterally [Normal Rate] : normal rate  [Regular Rhythm] : with a regular rhythm [No Joint Swelling] : no joint swelling [Grossly Normal Strength/Tone] : grossly normal strength/tone [No Rash] : no rash [Coordination Grossly Intact] : coordination grossly intact [No Focal Deficits] : no focal deficits [Normal Gait] : normal gait [Deep Tendon Reflexes (DTR)] : deep tendon reflexes were 2+ and symmetric [Normal Affect] : the affect was normal [Normal Insight/Judgement] : insight and judgment were intact [de-identified] : no erythema, no tonsillar enlargement bilaterally, no exudate bilaterally, uvula midline. +PND b/l TM erythema  [de-identified] : L ant cervical lymphadenopathy,

## 2024-07-03 ENCOUNTER — APPOINTMENT (OUTPATIENT)
Dept: MAMMOGRAPHY | Facility: CLINIC | Age: 59
End: 2024-07-03
Payer: COMMERCIAL

## 2024-07-03 ENCOUNTER — OUTPATIENT (OUTPATIENT)
Dept: OUTPATIENT SERVICES | Facility: HOSPITAL | Age: 59
LOS: 1 days | End: 2024-07-03
Payer: COMMERCIAL

## 2024-07-03 ENCOUNTER — APPOINTMENT (OUTPATIENT)
Dept: ULTRASOUND IMAGING | Facility: CLINIC | Age: 59
End: 2024-07-03
Payer: COMMERCIAL

## 2024-07-03 DIAGNOSIS — R92.2 INCONCLUSIVE MAMMOGRAM: ICD-10-CM

## 2024-07-03 DIAGNOSIS — Z00.8 ENCOUNTER FOR OTHER GENERAL EXAMINATION: ICD-10-CM

## 2024-07-03 DIAGNOSIS — Z12.31 ENCOUNTER FOR SCREENING MAMMOGRAM FOR MALIGNANT NEOPLASM OF BREAST: ICD-10-CM

## 2024-07-03 PROCEDURE — 77063 BREAST TOMOSYNTHESIS BI: CPT | Mod: 26

## 2024-07-03 PROCEDURE — 76641 ULTRASOUND BREAST COMPLETE: CPT

## 2024-07-03 PROCEDURE — 76641 ULTRASOUND BREAST COMPLETE: CPT | Mod: 26,50

## 2024-07-03 PROCEDURE — 77067 SCR MAMMO BI INCL CAD: CPT

## 2024-07-03 PROCEDURE — 77067 SCR MAMMO BI INCL CAD: CPT | Mod: 26

## 2024-07-03 PROCEDURE — 77063 BREAST TOMOSYNTHESIS BI: CPT

## 2024-07-10 ENCOUNTER — OUTPATIENT (OUTPATIENT)
Dept: OUTPATIENT SERVICES | Facility: HOSPITAL | Age: 59
LOS: 1 days | End: 2024-07-10
Payer: COMMERCIAL

## 2024-07-10 ENCOUNTER — APPOINTMENT (OUTPATIENT)
Dept: ULTRASOUND IMAGING | Facility: CLINIC | Age: 59
End: 2024-07-10
Payer: COMMERCIAL

## 2024-07-10 DIAGNOSIS — Z00.8 ENCOUNTER FOR OTHER GENERAL EXAMINATION: ICD-10-CM

## 2024-07-10 PROCEDURE — 76642 ULTRASOUND BREAST LIMITED: CPT

## 2024-07-10 PROCEDURE — 76642 ULTRASOUND BREAST LIMITED: CPT | Mod: 26,LT

## 2024-07-12 ENCOUNTER — RX RENEWAL (OUTPATIENT)
Age: 59
End: 2024-07-12

## 2024-07-16 ENCOUNTER — APPOINTMENT (OUTPATIENT)
Dept: MAMMOGRAPHY | Facility: CLINIC | Age: 59
End: 2024-07-16
Payer: COMMERCIAL

## 2024-07-16 ENCOUNTER — OUTPATIENT (OUTPATIENT)
Dept: OUTPATIENT SERVICES | Facility: HOSPITAL | Age: 59
LOS: 1 days | End: 2024-07-16
Payer: COMMERCIAL

## 2024-07-16 ENCOUNTER — RESULT REVIEW (OUTPATIENT)
Age: 59
End: 2024-07-16

## 2024-07-16 DIAGNOSIS — R92.8 OTHER ABNORMAL AND INCONCLUSIVE FINDINGS ON DIAGNOSTIC IMAGING OF BREAST: ICD-10-CM

## 2024-07-16 PROCEDURE — 88305 TISSUE EXAM BY PATHOLOGIST: CPT

## 2024-07-16 PROCEDURE — 19081 BX BREAST 1ST LESION STRTCTC: CPT | Mod: LT

## 2024-07-16 PROCEDURE — 88305 TISSUE EXAM BY PATHOLOGIST: CPT | Mod: 26

## 2024-07-16 PROCEDURE — 77065 DX MAMMO INCL CAD UNI: CPT

## 2024-07-16 PROCEDURE — 19081 BX BREAST 1ST LESION STRTCTC: CPT

## 2024-07-16 PROCEDURE — A4648: CPT

## 2024-07-16 PROCEDURE — 77065 DX MAMMO INCL CAD UNI: CPT | Mod: 26,LT

## 2024-07-19 LAB — SURGICAL PATHOLOGY STUDY: SIGNIFICANT CHANGE UP

## 2024-08-20 ENCOUNTER — APPOINTMENT (OUTPATIENT)
Dept: INTERNAL MEDICINE | Facility: CLINIC | Age: 59
End: 2024-08-20
Payer: COMMERCIAL

## 2024-08-20 ENCOUNTER — NON-APPOINTMENT (OUTPATIENT)
Age: 59
End: 2024-08-20

## 2024-08-20 VITALS
OXYGEN SATURATION: 97 % | TEMPERATURE: 98.5 F | HEART RATE: 68 BPM | WEIGHT: 186 LBS | BODY MASS INDEX: 34.23 KG/M2 | HEIGHT: 62 IN | DIASTOLIC BLOOD PRESSURE: 78 MMHG | RESPIRATION RATE: 17 BRPM | SYSTOLIC BLOOD PRESSURE: 119 MMHG

## 2024-08-20 DIAGNOSIS — I10 ESSENTIAL (PRIMARY) HYPERTENSION: ICD-10-CM

## 2024-08-20 DIAGNOSIS — Z29.9 ENCOUNTER FOR PROPHYLACTIC MEASURES, UNSPECIFIED: ICD-10-CM

## 2024-08-20 DIAGNOSIS — Z13.6 ENCOUNTER FOR SCREENING FOR CARDIOVASCULAR DISORDERS: ICD-10-CM

## 2024-08-20 DIAGNOSIS — E78.5 HYPERLIPIDEMIA, UNSPECIFIED: ICD-10-CM

## 2024-08-20 DIAGNOSIS — H91.90 UNSPECIFIED HEARING LOSS, UNSPECIFIED EAR: ICD-10-CM

## 2024-08-20 DIAGNOSIS — Z13.31 ENCOUNTER FOR SCREENING FOR DEPRESSION: ICD-10-CM

## 2024-08-20 DIAGNOSIS — E11.9 TYPE 2 DIABETES MELLITUS W/OUT COMPLICATIONS: ICD-10-CM

## 2024-08-20 DIAGNOSIS — E04.2 NONTOXIC MULTINODULAR GOITER: ICD-10-CM

## 2024-08-20 DIAGNOSIS — Z00.00 ENCOUNTER FOR GENERAL ADULT MEDICAL EXAMINATION W/OUT ABNORMAL FINDINGS: ICD-10-CM

## 2024-08-20 DIAGNOSIS — Z12.2 ENCOUNTER FOR SCREENING FOR MALIGNANT NEOPLASM OF RESPIRATORY ORGANS: ICD-10-CM

## 2024-08-20 DIAGNOSIS — Z11.1 ENCOUNTER FOR SCREENING FOR RESPIRATORY TUBERCULOSIS: ICD-10-CM

## 2024-08-20 PROCEDURE — G0444 DEPRESSION SCREEN ANNUAL: CPT | Mod: 59

## 2024-08-20 PROCEDURE — 93000 ELECTROCARDIOGRAM COMPLETE: CPT | Mod: 59

## 2024-08-20 PROCEDURE — 99396 PREV VISIT EST AGE 40-64: CPT | Mod: 25

## 2024-08-20 PROCEDURE — 36415 COLL VENOUS BLD VENIPUNCTURE: CPT

## 2024-08-20 NOTE — REVIEW OF SYSTEMS
[Hearing Loss] : hearing loss [Negative] : Heme/Lymph [Earache] : no earache [Nosebleed] : no nosebleeds [Hoarseness] : no hoarseness [Nasal Discharge] : no nasal discharge [Sore Throat] : no sore throat [Postnasal Drip] : no postnasal drip [FreeTextEntry4] : hearing loss

## 2024-08-20 NOTE — HISTORY OF PRESENT ILLNESS
[de-identified] : 59 year F here for Complete Physical Exam. Pt is daily exercising?  No Vaccinations: covid - fully vaccinated flu - will get this fall  tdap - got in past 10 years shingles - will get at pharmacy  PCV -PPSV  Screening: last colonoscopy: due for f/u Dr. Butt - [place]  last  - hx of polyps due for one - set up consult this 2024 paternal grandmother - colon cancer  obgyn: Dr Adela Jaoquin | [facility] LMP: postmenopausal  last pap: due for pap - has appt with obgyn last mammo: due for pap - has appt with obgyn Pregnancy:  - two boys  1 cousin had breast cancer - maternal   -Past Medical History:  Benign essential hypertension  Cervical disc herniation  Depression  mood stable denies si/hi not on any antidepressant Diabetes mellitus  Gout Hyperlipidemia Lumbar disc disease  Multinodular goiter - due for US of thyroid, following endo Thyroid cyst  Uncontrolled type 2 diabetes mellitus with hyperglycemia  -Allergies: NKDA -Medications: Aspirin 81 MG TABS; TAKE 1 TABLET DAILY Flonase 50 MCG/ACT SUSP FreeStyle Lite Test In Vitro Strip; USE TO TEST BLOOD GLUCOSE LEVELS ONCE DAILY Jardiance 10 MG Oral Tablet; TAKE ONE (1) TABLET BY MOUTH ONCE DAILY Losartan Potassium 50 MG Oral Tablet; TAKE ONE (1) TABLET BY MOUTH ONCE DAILY metFORMIN HCl - 1000 MG Oral Tablet; TAKE 1 TABLET BY MOUTH TWICE DAILY Montelukast Sodium 10 MG Oral Tablet; TAKE 1 TABLET IN THE EVENING Ozempic (1 MG/DOSE) 4 MG/3ML Subcutaneous Solution Pen-injector; 1 mg SQ weekly as directed Pravastatin Sodium 10 MG Oral Tablet; TAKE 1 TABLET BY MOUTH EVERYDAY AT BEDTIME Turmeric Curcumin Oral Capsule Vitamin D (Ergocalciferol) CAPS; TAKE 1 CAPSULE Daily -Surgical Hx: - C- section - Pilondial cyst  -Family Hx: Mother -arthritis -  2/2 to respiratory issues Father - heart disease Maternal Grandfather - heart disease - pna? Maternal Uncle - bile duct/pancreatic cancer Maternal Grandmother - typeIIDM -   Paternal Grandfather: heart disease   Paternal Grandmother: unknown  Siblings:1 brother - lymphoma -  due to blood clots 1 sister healthy  Children: 2 sons healthy  : healthy    pt denies any family hx of cervical, endometrial, uterine, ovarian,  lung, prostate  or testicular cancer -Social ETOH - socially  Smoker - former quit  at age approx 1 pack a day > 20 years Illicit Drug use - denies -Occupation:  Pt will send mammogram reports from this year, reports had a biopsy of breast - reports came back normal pt will send records

## 2024-08-20 NOTE — HEALTH RISK ASSESSMENT
[No] : No [No falls in past year] : Patient reported no falls in the past year [0] : 2) Feeling down, depressed, or hopeless: Not at all (0) [PHQ-2 Negative - No further assessment needed] : PHQ-2 Negative - No further assessment needed [Former] : Former [HIV test declined] : HIV test declined [Hepatitis C test declined] : Hepatitis C test declined [None] : None [With Family] : lives with family [Employed] : employed [High School] : high school [] :  [# Of Children ___] : has [unfilled] children [Feels Safe at Home] : Feels safe at home [Fully functional (bathing, dressing, toileting, transferring, walking, feeding)] : Fully functional (bathing, dressing, toileting, transferring, walking, feeding) [Fully functional (using the telephone, shopping, preparing meals, housekeeping, doing laundry, using] : Fully functional and needs no help or supervision to perform IADLs (using the telephone, shopping, preparing meals, housekeeping, doing laundry, using transportation, managing medications and managing finances) [Reports changes in hearing] : Reports changes in hearing [Reports normal functional visual acuity (ie: able to read med bottle)] : Reports normal functional visual acuity [Smoke Detector] : smoke detector [Carbon Monoxide Detector] : carbon monoxide detector [Safety elements used in home] : safety elements used in home [Seat Belt] :  uses seat belt [Sunscreen] : uses sunscreen [NO] : No [[id number]] : 0 [de-identified] : quit 2005 [Change in mental status noted] : No change in mental status noted [Language] : denies difficulty with language [Behavior] : denies difficulty with behavior [Learning/Retaining New Information] : denies difficulty learning/retaining new information [Handling Complex Tasks] : denies difficulty handling complex tasks [Reasoning] : denies difficulty with reasoning [Spatial Ability and Orientation] : denies difficulty with spatial ability and orientation [Sexually Active] : not sexually active [High Risk Behavior] : no high risk behavior [Reports changes in vision] : Reports no changes in vision [Reports changes in dental health] : Reports no changes in dental health [Travel to Developing Areas] : does not  travel to developing areas [TB Exposure] : is not being exposed to tuberculosis [Caregiver Concerns] : does not have caregiver concerns [de-identified] : getting hearing aids

## 2024-08-20 NOTE — PHYSICAL EXAM
[No Acute Distress] : no acute distress [Normal Sclera/Conjunctiva] : normal sclera/conjunctiva [PERRL] : pupils equal round and reactive to light [EOMI] : extraocular movements intact [Normal Outer Ear/Nose] : the outer ears and nose were normal in appearance [Normal Oropharynx] : the oropharynx was normal [No Lymphadenopathy] : no lymphadenopathy [No Accessory Muscle Use] : no accessory muscle use [No Respiratory Distress] : no respiratory distress  [Clear to Auscultation] : lungs were clear to auscultation bilaterally [No Edema] : there was no peripheral edema [Declined Breast Exam] : declined breast exam  [Declined Rectal Exam] : declined rectal exam [Normal Posterior Cervical Nodes] : no posterior cervical lymphadenopathy [Normal Anterior Cervical Nodes] : no anterior cervical lymphadenopathy [No CVA Tenderness] : no CVA  tenderness [No Spinal Tenderness] : no spinal tenderness [Normal] : affect was normal and insight and judgment were intact [de-identified] : diminished hearing  [de-identified] : RRR [de-identified] : declined [de-identified] : mood stable denies si/hi

## 2024-08-20 NOTE — ASSESSMENT
[FreeTextEntry1] : #CPE f/u blood and urine ekg - NSR no acute significant st or t wave changes - interpreted and reviewed results with pt. immunizations - will get shingles at pharmacy, PPSV 23 received I spent 5 minutes with the patient conducting a screen using approved screening tool (PHQ2) and discussing results of said screen with patient during this encounter. The patient was counseled to get regular exercise and sleep, wear sunblock and wear a safety belt in the car. Check CBC, CMP Hgba1c , TSH and UA Pap Smear Mammo Colonoscopy  Ophtho Derm Dental Lung Cancer Screening Hx of hearing loss getting hearing aids #HTN Continue Medication DASH DIET  Exercise. Lower your salt intake. Reduce your alcohol consumption. Learn relaxation methods. Eating is a very important part of controlling blood pressure. Recommend Total salt intake to 2.4g/day. Do not add salt while preparing or eating your meals. Try to avoid red meats, sweets and sugar containing beverages. Ensure you are eating 5 fruits and vegetables a day as well as whole grains. #TypeIIDM statin and losartan well along with ozempic 1mg qweekly, metformin 1000mg bid and jardiance 10mg  Continue monitoring FSG and or CGM. Encouraged to count carbohydrates, exercise daily and examine feet daily. If FSG/CGM are consistently greater than 300 or less than 70, patient should call MD. The patient was instructed to eat regularly without skipping meals.   Continue Check hgba1c Check umicroalb See ophtho Fasting blood glucose < 130 Postprandial blood glucose < 160 #Hearing loss  - f/u ENT #HL Continue current management Compliant w/ medication No side effects Follow low cholesterol diet low cholesterol diet, life style modification, increase exercise, more fruits and vegetables less sweet, carbs and starchy foods #TB screening f/u [facility]aferon gold denies a cough lasting longer than three weeks, unexplained weight loss, chills, night sweats or a fever, and loss of appetite. #Thyroid Cysts following Endo Dr. Ocasio pt agrees and understands plan via teach back method. all questions answered.

## 2024-08-28 LAB
25(OH)D3 SERPL-MCNC: 48.5 NG/ML
ALBUMIN SERPL ELPH-MCNC: 4.6 G/DL
ALP BLD-CCNC: 93 U/L
ALT SERPL-CCNC: 23 U/L
ANION GAP SERPL CALC-SCNC: 11 MMOL/L
APPEARANCE: CLEAR
AST SERPL-CCNC: 18 U/L
BACTERIA: NEGATIVE /HPF
BILIRUB SERPL-MCNC: 1.3 MG/DL
BILIRUBIN URINE: NEGATIVE
BLOOD URINE: NEGATIVE
BUN SERPL-MCNC: 10 MG/DL
CALCIUM SERPL-MCNC: 9.8 MG/DL
CAST: 0 /LPF
CHLORIDE SERPL-SCNC: 106 MMOL/L
CHOLEST SERPL-MCNC: 235 MG/DL
CO2 SERPL-SCNC: 26 MMOL/L
COLOR: YELLOW
CREAT SERPL-MCNC: 0.64 MG/DL
CREAT SPEC-SCNC: 23 MG/DL
EGFR: 102 ML/MIN/1.73M2
EPITHELIAL CELLS: 1 /HPF
ESTIMATED AVERAGE GLUCOSE: 143 MG/DL
FOLATE SERPL-MCNC: 14.4 NG/ML
GLUCOSE QUALITATIVE U: 500 MG/DL
GLUCOSE SERPL-MCNC: 100 MG/DL
HBA1C MFR BLD HPLC: 6.6 %
HCT VFR BLD CALC: 38.9 %
HDLC SERPL-MCNC: 62 MG/DL
HGB BLD-MCNC: 12.5 G/DL
IRON SATN MFR SERPL: 30 %
IRON SERPL-MCNC: 114 UG/DL
KETONES URINE: NEGATIVE MG/DL
LDLC SERPL CALC-MCNC: 144 MG/DL
LEUKOCYTE ESTERASE URINE: NEGATIVE
M TB IFN-G BLD-IMP: NEGATIVE
MCHC RBC-ENTMCNC: 29.1 PG
MCHC RBC-ENTMCNC: 32.1 GM/DL
MCV RBC AUTO: 90.5 FL
MICROALBUMIN 24H UR DL<=1MG/L-MCNC: <1.2 MG/DL
MICROALBUMIN/CREAT 24H UR-RTO: NORMAL MG/G
MICROSCOPIC-UA: NORMAL
NITRITE URINE: NEGATIVE
NONHDLC SERPL-MCNC: 173 MG/DL
PH URINE: 6
PLATELET # BLD AUTO: 308 K/UL
POTASSIUM SERPL-SCNC: 4.7 MMOL/L
PROT SERPL-MCNC: 6.8 G/DL
PROTEIN URINE: NEGATIVE MG/DL
QUANTIFERON TB PLUS MITOGEN MINUS NIL: 4.05 IU/ML
QUANTIFERON TB PLUS NIL: 0.05 IU/ML
QUANTIFERON TB PLUS TB1 MINUS NIL: 0 IU/ML
QUANTIFERON TB PLUS TB2 MINUS NIL: 0 IU/ML
RBC # BLD: 4.3 M/UL
RBC # FLD: 13.6 %
RED BLOOD CELLS URINE: 0 /HPF
SODIUM SERPL-SCNC: 142 MMOL/L
SPECIFIC GRAVITY URINE: 1.01
TIBC SERPL-MCNC: 373 UG/DL
TRIGL SERPL-MCNC: 162 MG/DL
TSH SERPL-ACNC: 0.78 UIU/ML
UIBC SERPL-MCNC: 259 UG/DL
UROBILINOGEN URINE: 0.2 MG/DL
VIT B12 SERPL-MCNC: 286 PG/ML
WBC # FLD AUTO: 5.6 K/UL
WHITE BLOOD CELLS URINE: 0 /HPF

## 2024-10-08 ENCOUNTER — RX RENEWAL (OUTPATIENT)
Age: 59
End: 2024-10-08

## 2024-11-04 ENCOUNTER — APPOINTMENT (OUTPATIENT)
Dept: GASTROENTEROLOGY | Facility: CLINIC | Age: 59
End: 2024-11-04
Payer: COMMERCIAL

## 2024-11-04 VITALS
WEIGHT: 178 LBS | OXYGEN SATURATION: 98 % | DIASTOLIC BLOOD PRESSURE: 72 MMHG | HEART RATE: 86 BPM | BODY MASS INDEX: 32.76 KG/M2 | SYSTOLIC BLOOD PRESSURE: 120 MMHG | TEMPERATURE: 97.1 F | HEIGHT: 62 IN

## 2024-11-04 DIAGNOSIS — Z80.3 FAMILY HISTORY OF MALIGNANT NEOPLASM OF BREAST: ICD-10-CM

## 2024-11-04 DIAGNOSIS — I10 ESSENTIAL (PRIMARY) HYPERTENSION: ICD-10-CM

## 2024-11-04 DIAGNOSIS — Z12.11 ENCOUNTER FOR SCREENING FOR MALIGNANT NEOPLASM OF COLON: ICD-10-CM

## 2024-11-04 DIAGNOSIS — Z80.0 FAMILY HISTORY OF MALIGNANT NEOPLASM OF DIGESTIVE ORGANS: ICD-10-CM

## 2024-11-04 DIAGNOSIS — Z82.49 FAMILY HISTORY OF ISCHEMIC HEART DISEASE AND OTHER DISEASES OF THE CIRCULATORY SYSTEM: ICD-10-CM

## 2024-11-04 DIAGNOSIS — Z82.61 FAMILY HISTORY OF ARTHRITIS: ICD-10-CM

## 2024-11-04 DIAGNOSIS — Z83.3 FAMILY HISTORY OF DIABETES MELLITUS: ICD-10-CM

## 2024-11-04 PROCEDURE — 99203 OFFICE O/P NEW LOW 30 MIN: CPT

## 2024-11-05 ENCOUNTER — APPOINTMENT (OUTPATIENT)
Dept: ENDOCRINOLOGY | Facility: CLINIC | Age: 59
End: 2024-11-05
Payer: COMMERCIAL

## 2024-11-05 VITALS
SYSTOLIC BLOOD PRESSURE: 140 MMHG | HEIGHT: 62 IN | HEART RATE: 68 BPM | DIASTOLIC BLOOD PRESSURE: 80 MMHG | RESPIRATION RATE: 16 BRPM | OXYGEN SATURATION: 97 % | WEIGHT: 182.56 LBS | BODY MASS INDEX: 33.6 KG/M2

## 2024-11-05 DIAGNOSIS — E66.9 OBESITY, UNSPECIFIED: ICD-10-CM

## 2024-11-05 DIAGNOSIS — E04.2 NONTOXIC MULTINODULAR GOITER: ICD-10-CM

## 2024-11-05 DIAGNOSIS — E11.9 TYPE 2 DIABETES MELLITUS W/OUT COMPLICATIONS: ICD-10-CM

## 2024-11-05 DIAGNOSIS — E78.5 HYPERLIPIDEMIA, UNSPECIFIED: ICD-10-CM

## 2024-11-05 LAB — GLUCOSE BLDC GLUCOMTR-MCNC: 76

## 2024-11-05 PROCEDURE — 99214 OFFICE O/P EST MOD 30 MIN: CPT

## 2024-11-05 PROCEDURE — 82962 GLUCOSE BLOOD TEST: CPT

## 2024-11-05 PROCEDURE — 36415 COLL VENOUS BLD VENIPUNCTURE: CPT

## 2024-11-05 RX ORDER — TIRZEPATIDE 2.5 MG/.5ML
2.5 INJECTION, SOLUTION SUBCUTANEOUS
Qty: 1 | Refills: 4 | Status: ACTIVE | COMMUNITY
Start: 2024-11-05 | End: 1900-01-01

## 2025-02-04 ENCOUNTER — APPOINTMENT (OUTPATIENT)
Dept: GASTROENTEROLOGY | Facility: AMBULATORY MEDICAL SERVICES | Age: 60
End: 2025-02-04
Payer: COMMERCIAL

## 2025-02-04 PROCEDURE — 45385 COLONOSCOPY W/LESION REMOVAL: CPT | Mod: 33

## 2025-03-28 ENCOUNTER — RX RENEWAL (OUTPATIENT)
Age: 60
End: 2025-03-28

## 2025-04-08 ENCOUNTER — APPOINTMENT (OUTPATIENT)
Dept: INTERNAL MEDICINE | Facility: CLINIC | Age: 60
End: 2025-04-08
Payer: COMMERCIAL

## 2025-04-08 VITALS
WEIGHT: 181 LBS | RESPIRATION RATE: 16 BRPM | TEMPERATURE: 98.1 F | HEART RATE: 78 BPM | BODY MASS INDEX: 33.31 KG/M2 | DIASTOLIC BLOOD PRESSURE: 70 MMHG | SYSTOLIC BLOOD PRESSURE: 119 MMHG | HEIGHT: 62 IN | OXYGEN SATURATION: 95 %

## 2025-04-08 DIAGNOSIS — E11.65 TYPE 2 DIABETES MELLITUS WITH HYPERGLYCEMIA: ICD-10-CM

## 2025-04-08 DIAGNOSIS — G47.00 INSOMNIA, UNSPECIFIED: ICD-10-CM

## 2025-04-08 DIAGNOSIS — E04.2 NONTOXIC MULTINODULAR GOITER: ICD-10-CM

## 2025-04-08 DIAGNOSIS — I10 ESSENTIAL (PRIMARY) HYPERTENSION: ICD-10-CM

## 2025-04-08 DIAGNOSIS — E78.5 HYPERLIPIDEMIA, UNSPECIFIED: ICD-10-CM

## 2025-04-08 PROCEDURE — 99214 OFFICE O/P EST MOD 30 MIN: CPT

## 2025-04-28 ENCOUNTER — RX RENEWAL (OUTPATIENT)
Age: 60
End: 2025-04-28

## 2025-06-11 ENCOUNTER — RX RENEWAL (OUTPATIENT)
Age: 60
End: 2025-06-11

## 2025-06-16 ENCOUNTER — APPOINTMENT (OUTPATIENT)
Dept: FAMILY MEDICINE | Facility: CLINIC | Age: 60
End: 2025-06-16

## 2025-07-02 ENCOUNTER — APPOINTMENT (OUTPATIENT)
Dept: ENDOCRINOLOGY | Facility: CLINIC | Age: 60
End: 2025-07-02
Payer: COMMERCIAL

## 2025-07-02 VITALS
BODY MASS INDEX: 33.68 KG/M2 | RESPIRATION RATE: 16 BRPM | WEIGHT: 183 LBS | SYSTOLIC BLOOD PRESSURE: 115 MMHG | HEIGHT: 62 IN | DIASTOLIC BLOOD PRESSURE: 72 MMHG | OXYGEN SATURATION: 99 % | HEART RATE: 71 BPM

## 2025-07-02 LAB — GLUCOSE BLDC GLUCOMTR-MCNC: 84

## 2025-07-02 PROCEDURE — 82962 GLUCOSE BLOOD TEST: CPT

## 2025-07-02 PROCEDURE — 99214 OFFICE O/P EST MOD 30 MIN: CPT | Mod: 25

## 2025-07-02 PROCEDURE — 36415 COLL VENOUS BLD VENIPUNCTURE: CPT

## 2025-07-03 ENCOUNTER — TRANSCRIPTION ENCOUNTER (OUTPATIENT)
Age: 60
End: 2025-07-03

## 2025-07-03 LAB
ALBUMIN SERPL ELPH-MCNC: 4.4 G/DL
ALP BLD-CCNC: 99 U/L
ALT SERPL-CCNC: 37 U/L
ANION GAP SERPL CALC-SCNC: 14 MMOL/L
AST SERPL-CCNC: 21 U/L
BASOPHILS # BLD AUTO: 0.04 K/UL
BASOPHILS NFR BLD AUTO: 0.6 %
BILIRUB SERPL-MCNC: 0.5 MG/DL
BUN SERPL-MCNC: 7 MG/DL
CALCIUM SERPL-MCNC: 9.6 MG/DL
CHLORIDE SERPL-SCNC: 106 MMOL/L
CHOLEST SERPL-MCNC: 243 MG/DL
CO2 SERPL-SCNC: 22 MMOL/L
CREAT SERPL-MCNC: 0.64 MG/DL
CREAT SPEC-SCNC: 66 MG/DL
EGFRCR SERPLBLD CKD-EPI 2021: 101 ML/MIN/1.73M2
EOSINOPHIL # BLD AUTO: 0.06 K/UL
EOSINOPHIL NFR BLD AUTO: 0.8 %
ESTIMATED AVERAGE GLUCOSE: 131 MG/DL
FOLATE SERPL-MCNC: 13.9 NG/ML
GLUCOSE SERPL-MCNC: 83 MG/DL
HBA1C MFR BLD HPLC: 6.2 %
HCT VFR BLD CALC: 42.2 %
HDLC SERPL-MCNC: 52 MG/DL
HGB BLD-MCNC: 13.3 G/DL
IMM GRANULOCYTES NFR BLD AUTO: 0.6 %
LDLC SERPL-MCNC: 151 MG/DL
LYMPHOCYTES # BLD AUTO: 2.47 K/UL
LYMPHOCYTES NFR BLD AUTO: 34.5 %
MAN DIFF?: NORMAL
MCHC RBC-ENTMCNC: 29.2 PG
MCHC RBC-ENTMCNC: 31.5 G/DL
MCV RBC AUTO: 92.7 FL
MICROALBUMIN 24H UR DL<=1MG/L-MCNC: <1.2 MG/DL
MICROALBUMIN/CREAT 24H UR-RTO: NORMAL MG/G
MONOCYTES # BLD AUTO: 0.4 K/UL
MONOCYTES NFR BLD AUTO: 5.6 %
NEUTROPHILS # BLD AUTO: 4.14 K/UL
NEUTROPHILS NFR BLD AUTO: 57.9 %
NONHDLC SERPL-MCNC: 191 MG/DL
PLATELET # BLD AUTO: 346 K/UL
POTASSIUM SERPL-SCNC: 4.3 MMOL/L
PROT SERPL-MCNC: 6.9 G/DL
RBC # BLD: 4.55 M/UL
RBC # FLD: 13.3 %
SODIUM SERPL-SCNC: 142 MMOL/L
TRIGL SERPL-MCNC: 218 MG/DL
TSH SERPL-ACNC: 0.87 UIU/ML
VIT B12 SERPL-MCNC: 301 PG/ML
WBC # FLD AUTO: 7.15 K/UL

## 2025-07-14 ENCOUNTER — TRANSCRIPTION ENCOUNTER (OUTPATIENT)
Age: 60
End: 2025-07-14

## 2025-08-05 ENCOUNTER — RX RENEWAL (OUTPATIENT)
Age: 60
End: 2025-08-05

## 2025-08-27 ENCOUNTER — APPOINTMENT (OUTPATIENT)
Dept: INTERNAL MEDICINE | Facility: CLINIC | Age: 60
End: 2025-08-27
Payer: COMMERCIAL

## 2025-08-27 VITALS
BODY MASS INDEX: 33.86 KG/M2 | WEIGHT: 184 LBS | HEIGHT: 62 IN | DIASTOLIC BLOOD PRESSURE: 80 MMHG | RESPIRATION RATE: 16 BRPM | TEMPERATURE: 98.6 F | SYSTOLIC BLOOD PRESSURE: 131 MMHG | HEART RATE: 72 BPM | OXYGEN SATURATION: 96 %

## 2025-08-27 DIAGNOSIS — I10 ESSENTIAL (PRIMARY) HYPERTENSION: ICD-10-CM

## 2025-08-27 DIAGNOSIS — E11.9 TYPE 2 DIABETES MELLITUS W/OUT COMPLICATIONS: ICD-10-CM

## 2025-08-27 DIAGNOSIS — Z11.1 ENCOUNTER FOR SCREENING FOR RESPIRATORY TUBERCULOSIS: ICD-10-CM

## 2025-08-27 DIAGNOSIS — G47.00 INSOMNIA, UNSPECIFIED: ICD-10-CM

## 2025-08-27 DIAGNOSIS — Z00.00 ENCOUNTER FOR GENERAL ADULT MEDICAL EXAMINATION W/OUT ABNORMAL FINDINGS: ICD-10-CM

## 2025-08-27 DIAGNOSIS — E78.5 HYPERLIPIDEMIA, UNSPECIFIED: ICD-10-CM

## 2025-08-27 PROCEDURE — 36415 COLL VENOUS BLD VENIPUNCTURE: CPT

## 2025-08-27 PROCEDURE — 99396 PREV VISIT EST AGE 40-64: CPT

## 2025-09-13 ENCOUNTER — TRANSCRIPTION ENCOUNTER (OUTPATIENT)
Age: 60
End: 2025-09-13

## 2025-09-14 ENCOUNTER — NON-APPOINTMENT (OUTPATIENT)
Age: 60
End: 2025-09-14